# Patient Record
Sex: FEMALE | Race: WHITE
[De-identification: names, ages, dates, MRNs, and addresses within clinical notes are randomized per-mention and may not be internally consistent; named-entity substitution may affect disease eponyms.]

---

## 2017-02-01 ENCOUNTER — IMPORTED ENCOUNTER (OUTPATIENT)
Age: 57
End: 2017-02-01

## 2017-02-01 PROCEDURE — 92014 COMPRE OPH EXAM EST PT 1/>: CPT

## 2017-10-09 ASSESSMENT — TONOMETRY
OD_IOP_MMHG: 13
OS_IOP_MMHG: 15

## 2017-10-09 ASSESSMENT — VISUAL ACUITY
OD_SC: 20/20-2
OS_SC: 20/20

## 2022-10-22 ENCOUNTER — HOSPITAL ENCOUNTER (EMERGENCY)
Age: 62
Discharge: STILL ADMITTED OR ADMINISTRATIVE | End: 2022-10-22

## 2022-10-22 ENCOUNTER — APPOINTMENT (OUTPATIENT)
Dept: GENERAL RADIOLOGY | Age: 62
DRG: 308 | End: 2022-10-22
Attending: PHYSICIAN ASSISTANT
Payer: MEDICAID

## 2022-10-22 ENCOUNTER — HOSPITAL ENCOUNTER (INPATIENT)
Age: 62
LOS: 7 days | Discharge: NURSING, REHAB OR LONG-TERM CARE | DRG: 308 | End: 2022-10-29
Attending: INTERNAL MEDICINE | Admitting: INTERNAL MEDICINE
Payer: MEDICAID

## 2022-10-22 PROBLEM — S72.009A HIP FRACTURE (HCC): Status: ACTIVE | Noted: 2022-10-22

## 2022-10-22 LAB
ERYTHROCYTE [DISTWIDTH] IN BLOOD BY AUTOMATED COUNT: 14.1 % (ref 11.5–14.5)
HCT VFR BLD AUTO: 33.1 % (ref 35–47)
HGB BLD-MCNC: 10.9 G/DL (ref 11.5–16)
MAGNESIUM SERPL-MCNC: 1.7 MG/DL (ref 1.6–2.4)
MCH RBC QN AUTO: 32.9 PG (ref 26–34)
MCHC RBC AUTO-ENTMCNC: 32.9 G/DL (ref 30–36.5)
MCV RBC AUTO: 100 FL (ref 80–99)
NRBC # BLD: 0 K/UL (ref 0–0.01)
NRBC BLD-RTO: 0 PER 100 WBC
PLATELET # BLD AUTO: 147 K/UL (ref 150–400)
PMV BLD AUTO: 10.6 FL (ref 8.9–12.9)
RBC # BLD AUTO: 3.31 M/UL (ref 3.8–5.2)
TSH SERPL DL<=0.05 MIU/L-ACNC: 2.7 UIU/ML (ref 0.36–3.74)
WBC # BLD AUTO: 7.7 K/UL (ref 3.6–11)

## 2022-10-22 PROCEDURE — 74011250636 HC RX REV CODE- 250/636: Performed by: INTERNAL MEDICINE

## 2022-10-22 PROCEDURE — 36415 COLL VENOUS BLD VENIPUNCTURE: CPT

## 2022-10-22 PROCEDURE — 74011250637 HC RX REV CODE- 250/637: Performed by: INTERNAL MEDICINE

## 2022-10-22 PROCEDURE — 74011000250 HC RX REV CODE- 250: Performed by: INTERNAL MEDICINE

## 2022-10-22 PROCEDURE — 83735 ASSAY OF MAGNESIUM: CPT

## 2022-10-22 PROCEDURE — 74011250637 HC RX REV CODE- 250/637: Performed by: ORTHOPAEDIC SURGERY

## 2022-10-22 PROCEDURE — 73552 X-RAY EXAM OF FEMUR 2/>: CPT

## 2022-10-22 PROCEDURE — 84443 ASSAY THYROID STIM HORMONE: CPT

## 2022-10-22 PROCEDURE — 65270000046 HC RM TELEMETRY

## 2022-10-22 PROCEDURE — 85027 COMPLETE CBC AUTOMATED: CPT

## 2022-10-22 RX ORDER — HYDROMORPHONE HYDROCHLORIDE 2 MG/1
4 TABLET ORAL
Status: DISCONTINUED | OUTPATIENT
Start: 2022-10-22 | End: 2022-10-29 | Stop reason: HOSPADM

## 2022-10-22 RX ORDER — ONDANSETRON 2 MG/ML
4 INJECTION INTRAMUSCULAR; INTRAVENOUS
Status: DISCONTINUED | OUTPATIENT
Start: 2022-10-22 | End: 2022-10-29 | Stop reason: HOSPADM

## 2022-10-22 RX ORDER — SODIUM CHLORIDE 0.9 % (FLUSH) 0.9 %
5-40 SYRINGE (ML) INJECTION AS NEEDED
Status: DISCONTINUED | OUTPATIENT
Start: 2022-10-22 | End: 2022-10-29 | Stop reason: HOSPADM

## 2022-10-22 RX ORDER — POLYETHYLENE GLYCOL 3350 17 G/17G
17 POWDER, FOR SOLUTION ORAL DAILY PRN
Status: DISCONTINUED | OUTPATIENT
Start: 2022-10-22 | End: 2022-10-29 | Stop reason: HOSPADM

## 2022-10-22 RX ORDER — HYDROXYZINE HYDROCHLORIDE 10 MG/1
10 TABLET, FILM COATED ORAL
Status: DISCONTINUED | OUTPATIENT
Start: 2022-10-22 | End: 2022-10-29 | Stop reason: HOSPADM

## 2022-10-22 RX ORDER — HYDROMORPHONE HYDROCHLORIDE 2 MG/1
1 TABLET ORAL
Status: DISCONTINUED | OUTPATIENT
Start: 2022-10-22 | End: 2022-10-22

## 2022-10-22 RX ORDER — TRAMADOL HYDROCHLORIDE 50 MG/1
100 TABLET ORAL EVERY 6 HOURS
Status: DISCONTINUED | OUTPATIENT
Start: 2022-10-22 | End: 2022-10-22

## 2022-10-22 RX ORDER — ACETAMINOPHEN 325 MG/1
650 TABLET ORAL
Status: DISCONTINUED | OUTPATIENT
Start: 2022-10-22 | End: 2022-10-22

## 2022-10-22 RX ORDER — ACETAMINOPHEN 650 MG/1
650 SUPPOSITORY RECTAL
Status: DISCONTINUED | OUTPATIENT
Start: 2022-10-22 | End: 2022-10-22

## 2022-10-22 RX ORDER — SODIUM CHLORIDE, SODIUM LACTATE, POTASSIUM CHLORIDE, CALCIUM CHLORIDE 600; 310; 30; 20 MG/100ML; MG/100ML; MG/100ML; MG/100ML
75 INJECTION, SOLUTION INTRAVENOUS CONTINUOUS
Status: DISCONTINUED | OUTPATIENT
Start: 2022-10-22 | End: 2022-10-25

## 2022-10-22 RX ORDER — ONDANSETRON 4 MG/1
4 TABLET, ORALLY DISINTEGRATING ORAL
Status: DISCONTINUED | OUTPATIENT
Start: 2022-10-22 | End: 2022-10-29 | Stop reason: HOSPADM

## 2022-10-22 RX ORDER — HYDROMORPHONE HYDROCHLORIDE 2 MG/1
2 TABLET ORAL
Status: DISCONTINUED | OUTPATIENT
Start: 2022-10-22 | End: 2022-10-23

## 2022-10-22 RX ORDER — MORPHINE SULFATE 2 MG/ML
2 INJECTION, SOLUTION INTRAMUSCULAR; INTRAVENOUS
Status: DISCONTINUED | OUTPATIENT
Start: 2022-10-22 | End: 2022-10-22

## 2022-10-22 RX ORDER — TRAMADOL HYDROCHLORIDE 50 MG/1
100 TABLET ORAL EVERY 6 HOURS
Status: DISCONTINUED | OUTPATIENT
Start: 2022-10-23 | End: 2022-10-25

## 2022-10-22 RX ORDER — SODIUM CHLORIDE 0.9 % (FLUSH) 0.9 %
5-40 SYRINGE (ML) INJECTION EVERY 8 HOURS
Status: DISCONTINUED | OUTPATIENT
Start: 2022-10-22 | End: 2022-10-29 | Stop reason: HOSPADM

## 2022-10-22 RX ORDER — ACETAMINOPHEN 500 MG
1000 TABLET ORAL EVERY 6 HOURS
Status: DISCONTINUED | OUTPATIENT
Start: 2022-10-23 | End: 2022-10-29 | Stop reason: HOSPADM

## 2022-10-22 RX ADMIN — SODIUM CHLORIDE, POTASSIUM CHLORIDE, SODIUM LACTATE AND CALCIUM CHLORIDE 75 ML/HR: 600; 310; 30; 20 INJECTION, SOLUTION INTRAVENOUS at 15:35

## 2022-10-22 RX ADMIN — ACETAMINOPHEN 1000 MG: 500 TABLET ORAL at 23:09

## 2022-10-22 RX ADMIN — HYDROXYZINE HYDROCHLORIDE 10 MG: 10 TABLET ORAL at 17:53

## 2022-10-22 RX ADMIN — SODIUM CHLORIDE, PRESERVATIVE FREE 10 ML: 5 INJECTION INTRAVENOUS at 15:00

## 2022-10-22 RX ADMIN — SODIUM CHLORIDE, PRESERVATIVE FREE 10 ML: 5 INJECTION INTRAVENOUS at 21:37

## 2022-10-22 RX ADMIN — TRAMADOL HYDROCHLORIDE 100 MG: 50 TABLET, COATED ORAL at 23:09

## 2022-10-22 RX ADMIN — MORPHINE SULFATE 2 MG: 2 INJECTION, SOLUTION INTRAMUSCULAR; INTRAVENOUS at 15:32

## 2022-10-22 RX ADMIN — ACETAMINOPHEN 650 MG: 325 TABLET ORAL at 17:53

## 2022-10-22 RX ADMIN — HYDROMORPHONE HYDROCHLORIDE 1 MG: 2 TABLET ORAL at 16:39

## 2022-10-22 RX ADMIN — HYDROMORPHONE HYDROCHLORIDE 4 MG: 4 TABLET ORAL at 18:48

## 2022-10-22 NOTE — H&P
[facility] Adult  Hospitalist Group  History and Physical    Date of Service:  10/22/2022  Primary Care Provider: None  Source of information: The patient    Chief Complaint: No chief complaint on file. Right lower extremity pain, right femur fracture, periprosthetic    History of Presenting Illness:   Greg Ramirez is a 63 y.o. female with known past medical history of chronic atrial fibrillation flutter on anticoagulation with Coumadin, congestive heart failure, chronic kidney disease, obstructive sleep apnea on CPAP, osteoarthritis, right knee replacement 2 years ago who presents to ED with complaint of right knee pain after mechanical fall at home, patient tripped and fall in the kitchen on her right knee on Friday, yesterday. Initially she was evaluated in outside hospital, x-ray of the right knee was done and was significant for right femur fracture closed displaced comminuted fracture periprosthetic. So patient was transferred to [facility] for further evaluation and treatment and orthopedic consultation. The patient denied having any head trauma, loss of conscious, fever, chest pain, shortness of breath, productive cough, nausea, vomiting, diarrhea. She has a lot of pain in your right knee. Last time she took Eliquis was Friday morning, October 21, 2022. She usually able to ambulate with his cane. She uses CPAP at night, she does not require supplemental oxygen. The patient denies any headache, blurry vision, sore throat, trouble swallowing, trouble with speech, chest pain, SOB, cough, fever, chills, N/V/D, abd pain, urinary symptoms, constipation, recent travels, sick contacts, focal or generalized neurological symptoms, rashes, contact with COVID-19 diagnosed patients, hematemesis, melena, hemoptysis, hematuria, rashes, denies starting any new medications and denies any other concerns or problems besides as mentioned above.          REVIEW OF SYSTEMS:  A comprehensive review of systems was negative except for that written in the History of Present Illness. No past medical history on file. No past surgical history on file. Prior to Admission medications    Not on File   Atrial fibrillation, atrial flutter  Chronic kidney disease  Congestive heart failure, diastolic, LVH  And anxiety  Obstructive sleep apnea on CPAP  Osteoarthritis  Morbid obesity    Allergies   Allergen Reactions    Advil [Ibuprofen] Unknown (comments)    Aleve Pm [Naproxen-Diphenhydramine] Other (comments)    Aspirin Unknown (comments)    Bleach (Sodium Hypochlorite) Hives    Nsaids (Non-Steroidal Anti-Inflammatory Drug) Unknown (comments)      No family history on file. Social History:   Denied tobacco illicit drug use. Positive: Alcohol 3 drinks over the weekend. Family and social history were personally reviewed, all pertinent and relevant details are outlined as above. Family history significant for diabetes, stroke. Objective:   Visit Vitals  /70 (BP 1 Location: Left arm, BP Patient Position: At rest)   Temp 98.5 °F (36.9 °C)   Resp 18   SpO2 94%           PHYSICAL EXAM:   General: Alert x oriented x 3, awake, in acute distress due to pain right knee, in bed, pleasant female, appears to be stated age  HEENT: PEERL, EOMI, moist mucus membranes  Neck: Supple, no JVD, no meningeal signs  Chest: Clear to auscultation bilaterally   CVS: IRIR, S1 S2 heard, no rubs/gallops  Abd: Soft, non-tender, non-distended, +bowel sounds, obese  Ext: No clubbing, no cyanosis, no edema  Neuro/Psych: Pleasant mood and affect, CN 2-12 grossly intact, sensory grossly within normal limit, unable move RLE DTR 1+ x 4  Cap refill: Brisk, less than 3 seconds  Pulses: 2+, symmetric in all extremities  Skin: Warm, dry, without rashes or lesions    Data Review: All diagnostic labs and studies have been reviewed.     Abnormal Labs Reviewed - No data to display    All Micro Results       None IMAGING:   XR FEMUR RT 2 VS    (Results Pending)   Outside right knee XR:  Displaced femoral diaphysis right knee periprosthetic fracture. ECG/ECHO:  No results found for this or any previous visit. Assessment:   Given the patient's current clinical presentation, there is a high level of concern for decompensation if discharged from the emergency department. Complex decision making was performed, which includes reviewing the patient's available past medical records, laboratory results, and imaging studies. Right femoral fracture, diaphysis, periprosthetic    Status post mechanical fall at home,    Intractable pain right femur    Atrial fibrillation/atrial flutter on anticoagulation with Eliquis last dose October 21, 2020 2 in the morning    Congestive heart failure unknown systolic or diastolic    Obstructive sleep apnea on CPAP    Morbid obesity    Osteoarthritis  Plan:     The patient will be admitted to medicine, telemetry, is inpatient and will require at least 2 midnight for inpatient treatment. IV fluids for hydration will be provided. Pain will be controlled with Dilaudid IV. Patient will be kept n.p.o. for now  Ortho surgeon was consulted for further recommendation  Eliquis will be on hold  CPAP at nighttime will be provided  Home medication will be reconciled and reinstated except Eliquis will be on hold  ECG 12 leads and echocardiogram will be requested        DIET: ADULT DIET Regular   ISOLATION PRECAUTIONS: There are currently no Active Isolations  CODE STATUS: Full Code   DVT PROPHYLAXIS: SCDs  FUNCTIONAL STATUS PRIOR TO HOSPITALIZATION: Ambulatory and capable of self-care but relies on assistive devices (rolling walker/cane). Ambulatory status/function: By self   EARLY MOBILITY ASSESSMENT: Recommend an assessment from physical therapy and/or occupational therapy  ANTICIPATED DISCHARGE: 24-48 hours.   ANTICIPATED DISPOSITION: IPR  EMERGENCY CONTACT/SURROGATE DECISION MAKER: son    CRITICAL CARE WAS PERFORMED FOR THIS ENCOUNTER: NO.      Signed By: Kannan Wilson MD     October 22, 2022         Please note that this dictation may have been completed with Dragon, the computer voice recognition software. Quite often unanticipated grammatical, syntax, homophones, and other interpretive errors are inadvertently transcribed by the computer software. Please disregard these errors. Please excuse any errors that have escaped final proofreading.

## 2022-10-22 NOTE — PROGRESS NOTES
Problem: Falls - Risk of  Goal: *Absence of Falls  Description: Document Julian Lobato Fall Risk and appropriate interventions in the flowsheet.   Outcome: Progressing Towards Goal  Note: Fall Risk Interventions:                 Elimination Interventions: Call light in reach, Bed/chair exit alarm    History of Falls Interventions: Bed/chair exit alarm, Investigate reason for fall, Room close to nurse's station         Problem: Patient Education: Go to Patient Education Activity  Goal: Patient/Family Education  Outcome: Progressing Towards Goal

## 2022-10-22 NOTE — PROGRESS NOTES
Pt seen and examined. May eat. Bedrest, judicious pain management, mechanical dvt proph, plan for surgery Monday pm.  She had last vuong of eliquis noon Friday.  Sohan

## 2022-10-23 ENCOUNTER — APPOINTMENT (OUTPATIENT)
Dept: GENERAL RADIOLOGY | Age: 62
DRG: 308 | End: 2022-10-23
Attending: NURSE PRACTITIONER
Payer: MEDICAID

## 2022-10-23 LAB
ALBUMIN SERPL-MCNC: 3.4 G/DL (ref 3.5–5)
ALBUMIN/GLOB SERPL: 0.9 {RATIO} (ref 1.1–2.2)
ALP SERPL-CCNC: 80 U/L (ref 45–117)
ALT SERPL-CCNC: 27 U/L (ref 12–78)
ANION GAP SERPL CALC-SCNC: 8 MMOL/L (ref 5–15)
AST SERPL-CCNC: 23 U/L (ref 15–37)
ATRIAL RATE: 159 BPM
BASOPHILS # BLD: 0 K/UL (ref 0–0.1)
BASOPHILS NFR BLD: 1 % (ref 0–1)
BILIRUB SERPL-MCNC: 0.8 MG/DL (ref 0.2–1)
BUN SERPL-MCNC: 12 MG/DL (ref 6–20)
BUN/CREAT SERPL: 13 (ref 12–20)
CALCIUM SERPL-MCNC: 8.9 MG/DL (ref 8.5–10.1)
CALCULATED R AXIS, ECG10: 8 DEGREES
CALCULATED T AXIS, ECG11: 175 DEGREES
CHLORIDE SERPL-SCNC: 102 MMOL/L (ref 97–108)
CO2 SERPL-SCNC: 26 MMOL/L (ref 21–32)
CREAT SERPL-MCNC: 0.96 MG/DL (ref 0.55–1.02)
DIAGNOSIS, 93000: NORMAL
DIFFERENTIAL METHOD BLD: ABNORMAL
EOSINOPHIL # BLD: 0.1 K/UL (ref 0–0.4)
EOSINOPHIL NFR BLD: 2 % (ref 0–7)
ERYTHROCYTE [DISTWIDTH] IN BLOOD BY AUTOMATED COUNT: 14.1 % (ref 11.5–14.5)
GLOBULIN SER CALC-MCNC: 3.7 G/DL (ref 2–4)
GLUCOSE SERPL-MCNC: 156 MG/DL (ref 65–100)
HCT VFR BLD AUTO: 32.1 % (ref 35–47)
HGB BLD-MCNC: 10.6 G/DL (ref 11.5–16)
IMM GRANULOCYTES # BLD AUTO: 0 K/UL (ref 0–0.04)
IMM GRANULOCYTES NFR BLD AUTO: 0 % (ref 0–0.5)
INR PPP: 1 (ref 0.9–1.1)
LACTATE SERPL-SCNC: 2.4 MMOL/L (ref 0.4–2)
LACTATE SERPL-SCNC: 2.4 MMOL/L (ref 0.4–2)
LACTATE SERPL-SCNC: 2.6 MMOL/L (ref 0.4–2)
LYMPHOCYTES # BLD: 1.4 K/UL (ref 0.8–3.5)
LYMPHOCYTES NFR BLD: 20 % (ref 12–49)
MAGNESIUM SERPL-MCNC: 1.5 MG/DL (ref 1.6–2.4)
MCH RBC QN AUTO: 33.2 PG (ref 26–34)
MCHC RBC AUTO-ENTMCNC: 33 G/DL (ref 30–36.5)
MCV RBC AUTO: 100.6 FL (ref 80–99)
MONOCYTES # BLD: 0.6 K/UL (ref 0–1)
MONOCYTES NFR BLD: 9 % (ref 5–13)
NEUTS SEG # BLD: 4.8 K/UL (ref 1.8–8)
NEUTS SEG NFR BLD: 68 % (ref 32–75)
NRBC # BLD: 0 K/UL (ref 0–0.01)
NRBC BLD-RTO: 0 PER 100 WBC
PLATELET # BLD AUTO: 142 K/UL (ref 150–400)
PMV BLD AUTO: 10.2 FL (ref 8.9–12.9)
POTASSIUM SERPL-SCNC: 3.5 MMOL/L (ref 3.5–5.1)
PROT SERPL-MCNC: 7.1 G/DL (ref 6.4–8.2)
PROTHROMBIN TIME: 10.6 SEC (ref 9–11.1)
Q-T INTERVAL, ECG07: 214 MS
QRS DURATION, ECG06: 88 MS
QTC CALCULATION (BEZET), ECG08: 347 MS
RBC # BLD AUTO: 3.19 M/UL (ref 3.8–5.2)
SODIUM SERPL-SCNC: 136 MMOL/L (ref 136–145)
VENTRICULAR RATE, ECG03: 158 BPM
WBC # BLD AUTO: 7 K/UL (ref 3.6–11)

## 2022-10-23 PROCEDURE — 71045 X-RAY EXAM CHEST 1 VIEW: CPT

## 2022-10-23 PROCEDURE — 36415 COLL VENOUS BLD VENIPUNCTURE: CPT

## 2022-10-23 PROCEDURE — 74011250637 HC RX REV CODE- 250/637: Performed by: INTERNAL MEDICINE

## 2022-10-23 PROCEDURE — 74011250637 HC RX REV CODE- 250/637: Performed by: PHYSICIAN ASSISTANT

## 2022-10-23 PROCEDURE — 83605 ASSAY OF LACTIC ACID: CPT

## 2022-10-23 PROCEDURE — 74011250636 HC RX REV CODE- 250/636: Performed by: NURSE PRACTITIONER

## 2022-10-23 PROCEDURE — 83735 ASSAY OF MAGNESIUM: CPT

## 2022-10-23 PROCEDURE — 85025 COMPLETE CBC W/AUTO DIFF WBC: CPT

## 2022-10-23 PROCEDURE — 74011000250 HC RX REV CODE- 250: Performed by: INTERNAL MEDICINE

## 2022-10-23 PROCEDURE — 80053 COMPREHEN METABOLIC PANEL: CPT

## 2022-10-23 PROCEDURE — 65270000046 HC RM TELEMETRY

## 2022-10-23 PROCEDURE — 74011250637 HC RX REV CODE- 250/637: Performed by: HOSPITALIST

## 2022-10-23 PROCEDURE — 74011250637 HC RX REV CODE- 250/637: Performed by: NURSE PRACTITIONER

## 2022-10-23 PROCEDURE — 93005 ELECTROCARDIOGRAM TRACING: CPT

## 2022-10-23 PROCEDURE — 85610 PROTHROMBIN TIME: CPT

## 2022-10-23 PROCEDURE — 74011250637 HC RX REV CODE- 250/637: Performed by: ORTHOPAEDIC SURGERY

## 2022-10-23 PROCEDURE — 74011000250 HC RX REV CODE- 250: Performed by: NURSE PRACTITIONER

## 2022-10-23 RX ORDER — DILTIAZEM HYDROCHLORIDE 5 MG/ML
5 INJECTION INTRAVENOUS ONCE
Status: COMPLETED | OUTPATIENT
Start: 2022-10-23 | End: 2022-10-23

## 2022-10-23 RX ORDER — CYCLOBENZAPRINE HCL 10 MG
10 TABLET ORAL
Status: DISCONTINUED | OUTPATIENT
Start: 2022-10-23 | End: 2022-10-29 | Stop reason: HOSPADM

## 2022-10-23 RX ORDER — HYDROMORPHONE HYDROCHLORIDE 2 MG/1
2 TABLET ORAL
Status: DISCONTINUED | OUTPATIENT
Start: 2022-10-23 | End: 2022-10-29 | Stop reason: HOSPADM

## 2022-10-23 RX ORDER — METOPROLOL TARTRATE 50 MG/1
50 TABLET ORAL 2 TIMES DAILY
Status: DISCONTINUED | OUTPATIENT
Start: 2022-10-23 | End: 2022-10-29 | Stop reason: HOSPADM

## 2022-10-23 RX ORDER — ZOLPIDEM TARTRATE 5 MG/1
5 TABLET ORAL
Status: DISCONTINUED | OUTPATIENT
Start: 2022-10-23 | End: 2022-10-29 | Stop reason: HOSPADM

## 2022-10-23 RX ADMIN — SODIUM CHLORIDE, PRESERVATIVE FREE 10 ML: 5 INJECTION INTRAVENOUS at 21:35

## 2022-10-23 RX ADMIN — TRAMADOL HYDROCHLORIDE 100 MG: 50 TABLET, COATED ORAL at 18:01

## 2022-10-23 RX ADMIN — HYDROMORPHONE HYDROCHLORIDE 4 MG: 4 TABLET ORAL at 23:04

## 2022-10-23 RX ADMIN — ACETAMINOPHEN 1000 MG: 500 TABLET ORAL at 18:01

## 2022-10-23 RX ADMIN — TRAMADOL HYDROCHLORIDE 100 MG: 50 TABLET, COATED ORAL at 11:35

## 2022-10-23 RX ADMIN — ACETAMINOPHEN 1000 MG: 500 TABLET ORAL at 23:03

## 2022-10-23 RX ADMIN — DILTIAZEM HYDROCHLORIDE 5 MG: 5 INJECTION, SOLUTION INTRAVENOUS at 07:28

## 2022-10-23 RX ADMIN — ACETAMINOPHEN 1000 MG: 500 TABLET ORAL at 11:35

## 2022-10-23 RX ADMIN — ACETAMINOPHEN 1000 MG: 500 TABLET ORAL at 05:08

## 2022-10-23 RX ADMIN — METOPROLOL TARTRATE 50 MG: 50 TABLET ORAL at 18:01

## 2022-10-23 RX ADMIN — CYCLOBENZAPRINE 10 MG: 10 TABLET, FILM COATED ORAL at 11:36

## 2022-10-23 RX ADMIN — ZOLPIDEM TARTRATE 5 MG: 5 TABLET ORAL at 23:04

## 2022-10-23 RX ADMIN — HYDROMORPHONE HYDROCHLORIDE 4 MG: 4 TABLET ORAL at 18:00

## 2022-10-23 RX ADMIN — TRAMADOL HYDROCHLORIDE 100 MG: 50 TABLET, COATED ORAL at 05:08

## 2022-10-23 RX ADMIN — HYDROXYZINE HYDROCHLORIDE 10 MG: 10 TABLET ORAL at 06:04

## 2022-10-23 RX ADMIN — METOPROLOL TARTRATE 50 MG: 50 TABLET ORAL at 05:55

## 2022-10-23 RX ADMIN — HYDROMORPHONE HYDROCHLORIDE 2 MG: 2 TABLET ORAL at 02:43

## 2022-10-23 RX ADMIN — SODIUM CHLORIDE, PRESERVATIVE FREE 10 ML: 5 INJECTION INTRAVENOUS at 05:08

## 2022-10-23 NOTE — PROGRESS NOTES
[facility]ist [facility]                                                                                          Hospitalist Progress Note  [place], [place]  Answering service: [phone] OR [phone] from in house phone        Date of Service:  10/23/2022  NAME:  Dylan Fair  :  1960  MRN:  [id number]      Admission Summary:   Dylan Fair is a 63 y.o. female with known past medical history of chronic atrial fibrillation flutter on anticoagulation with Coumadin, congestive heart failure, chronic kidney disease, obstructive sleep apnea on CPAP, osteoarthritis, right knee replacement 2 years ago who presents to ED with complaint of right knee pain after mechanical fall at home, patient tripped and fall in the kitchen on her right knee on Friday, yesterday. Initially she was evaluated in outside hospital, x-ray of the right knee was done and was significant for right femur fracture closed displaced comminuted fracture periprosthetic. So patient was [facility] to [facility] for further evaluation and treatment and orthopedic consultation. She uses CPAP at night, she does not require supplemental oxygen. Interval history / Subjective:   Pt had episode of A-fib with RVR, pt reports HR was in 150s. Recived 5mg diltiazem, CXR was without acute cardiopulmonary process. Pt denies any chest pain, palpitations, shortness of breath or lightheadedness. She endorses some mild back pain from her fall Friday,      Assessment & Plan:     Chronic Atrial Flutter  Atrial Fibrillation with RVR  - Pt went into A-fib with RVR overnight 10/22-. Received diltiazem 5mg  - CXR (10/23):  No acute cardiopulmonary process  - Resume home metoprolol  - Currently rate controlled in the 80s, will consider further doses of diltiazem if needed  - Holding home eliquis for anticipated orthopedic surgery intervention    Comminuted fracture of Right femur  - Xray: demonstrates femoral shaft fracture  - Ortho following: Plan for procedure tomorrow (10/24)  - Hold home eliquis  - Scheduled tramadol  - PRN dilaudid, flexeril  - Lactate 2.6, down-trended to 2.4, will recheck  in AM unless clinically indicated    Obstructive sleep apnea  - bedtime CPAP ordered    Code status: Full  Prophylaxis: SCDs, holding eliquis for upcoming procedure  Care Plan discussed with: Patient, RN student, consulting MD  Anticipated Disposition: TBD     Hospital Problems  Never Reviewed            Codes Class Noted POA    Hip fracture [facility]) ICD-10-CM: S72.009A  ICD-9-CM: 820.8  10/22/2022 Unknown             Review of Systems:   Pertinent items are noted in HPI. Vital Signs:    Last 24hrs VS reviewed since prior progress note. Most recent are:  Visit Vitals  /78 (BP 1 Location: Left upper arm, BP Patient Position: At rest)   Pulse (!) 105   Temp 99.1 °F (37.3 °C)   Resp 18   SpO2 93%         Intake/Output Summary (Last 24 hours) at 10/23/2022 0933  Last data filed at 10/23/2022 3387  Gross per 24 hour   Intake --   Output 900 ml   Net -900 ml        Physical Examination:     I had a face to face encounter with this patient and independently examined them on 10/23/2022 as outlined below:          Constitutional:  No acute distress, cooperative, pleasant    ENT:  Oral mucosa moist, oropharynx benign. Resp:  CTA bilaterally. No wheezing/rhonchi/rales. No accessory muscle use. CV:  Tachycardic, irregularly irregular rhythm, no murmurs, gallops, rubs    GI:  Soft, non distended, non tender. normoactive bowel sounds, no hepatosplenomegaly    Musculoskeletal:  RLE with brace and ACE wrap in place dressing clean, dry intact, trace edema in BLE, 2+ pulses throughout, neurovascularly grossly intact in BLE    Neurologic:  Moves all extremities.   AAOx3, CN II-XII reviewed            Data Review:    Review and/or order of clinical lab test  Review and/or order of tests in the radiology section of CPT  Review and/or order of tests in the medicine section of [facility]      Labs:     Recent Labs     10/23/22  0327 10/22/22  1720   WBC 7.0 7.7   HGB 10.6* 10.9*   HCT 32.1* 33.1*   * 147*     Recent Labs     10/23/22  0327 10/22/22  1720     --    K 3.5  --      --    CO2 26  --    BUN 12  --    CREA 0.96  --    *  --    CA 8.9  --    MG 1.5* 1.7     Recent Labs     10/23/22  0327   ALT 27   AP 80   TBILI 0.8   TP 7.1   ALB 3.4*   GLOB 3.7     Recent Labs     10/23/22  0327   INR 1.0   PTP 10.6      No results for input(s): FE, TIBC, PSAT, FERR in the last 72 hours. No results found for: FOL, RBCF   No results for input(s): PH, PCO2, PO2 in the last 72 hours. No results for input(s): CPK, CKNDX, TROIQ in the last 72 hours.     No lab exists for component: CPKMB  No results found for: CHOL, CHOLX, CHLST, CHOLV, HDL, HDLP, LDL, LDLC, DLDLP, TGLX, TRIGL, TRIGP, CHHD, CHHDX  No results found for: GLUCPOC  No results found for: COLOR, APPRN, SPGRU, REFSG, SHANELLE, PROTU, GLUCU, KETU, BILU, UROU, WOLF, LEUKU, GLUKE, EPSU, BACTU, WBCU, RBCU, CASTS, UCRY      Medications Reviewed:     Current Facility-Administered Medications   Medication Dose Route Frequency    metoprolol tartrate (LOPRESSOR) tablet 50 mg  50 mg Oral BID    sodium chloride 0.9 % bolus infusion 250 mL  250 mL IntraVENous ONCE    sodium chloride (NS) flush 5-40 mL  5-40 mL IntraVENous Q8H    sodium chloride (NS) flush 5-40 mL  5-40 mL IntraVENous PRN    polyethylene glycol (MIRALAX) packet 17 g  17 g Oral DAILY PRN    ondansetron (ZOFRAN ODT) tablet 4 mg  4 mg Oral Q8H PRN    Or    ondansetron (ZOFRAN) injection 4 mg  4 mg IntraVENous Q6H PRN    lactated Ringers infusion  75 mL/hr IntraVENous CONTINUOUS    hydrOXYzine HCL (ATARAX) tablet 10 mg  10 mg Oral TID PRN    HYDROmorphone (DILAUDID) tablet 2 mg  2 mg Oral Q4H PRN    acetaminophen (TYLENOL) tablet 1,000 mg  1,000 mg Oral Q6H    HYDROmorphone (DILAUDID) tablet 4 mg  4 mg Oral Q4H PRN    traMADoL (ULTRAM) tablet 100 mg  100 mg Oral Q6H     ______________________________________________________________________  EXPECTED LENGTH OF STAY: - - -  ACTUAL LENGTH OF STAY:          1                 Arch Holler Milligram, PA-C

## 2022-10-23 NOTE — PROGRESS NOTES
Physical Therapy  Patient admitted following GLF with fracture of femur. Is now on bed rest awaiting surgery. Will follow up post op.   Aki Naqvi PT

## 2022-10-23 NOTE — PROGRESS NOTES
Occupational Therapy Note:     Pt with GLF prior to admission and found to have hip fracture. Pt to be on bedrest pending surgery currently planned for Monday pm.  Will hold until after surgery.   Thanks,     Adrianna Morales, OT

## 2022-10-23 NOTE — PROGRESS NOTES
Patient is a pleasant 72-year-old woman who was in her kitchen last night when she sustained a ground level fall. Unable to walk. She lives in [place], [place]. She went to a local facility where no one was able to treat her so she was transferred to . She is on Eliquis and took her last dose at noon on Friday. Had a total knee done many years ago on the right and did well with that. On exam in pain laying in the hospital bed with a knee immobilizer in place. Grossly neurovascularly intact and no calf tenderness. X-rays including AP and lateral show a comminuted fracture of the midshaft of the femur above a total knee with a butterfly fragment. For now, judicious pain management, DVT prophylaxis, mechanical, bed rest and ice and elevation watch for neurovascular compromise. Knee immobilizer. Medical clearance. Plan to proceed with surgery next week. Dr. Anaid Jim will take over. Encounter was over 30 minutes in face-to-face contact time and coordination of care over half of which was spent in counseling.

## 2022-10-23 NOTE — PROGRESS NOTES
ORTHO FRACTURE PROGRESS NOTE    2022  Admit Date:   10/22/2022    Post Op day: * No surgery found *    Subjective:    Bernardo People states that her pain is relatively well controlled but she notices when pain meds wear off. States she has some muscle spasms as well. Right TKA done about 12yrs ago per patient in [address] [place] [address] by a Dr Jeremy Alejo. Has been following with [facility]. States she was doing well with knee prior to injury when she slipped and fell on a puddle in her kitchen. Denies tingling or numbness.   Tolerating diet  Denies N/V/SOB or CP     PT/OT:   Gait:                    Vital Signs:    Patient Vitals for the past 8 hrs:   BP Temp Pulse Resp SpO2 Weight   10/23/22 1015 -- -- -- -- -- 150 kg (330 lb 11 oz)   10/23/22 1000 -- -- (!) 118 -- -- --   10/23/22 0755 -- -- (!) 105 -- -- --   10/23/22 0723 120/78 99.1 °F (37.3 °C) (!) 117 18 93 % --   10/23/22 0555 129/84 -- (!) 154 -- -- --   10/23/22 0355 120/76 98.2 °F (36.8 °C) 88 18 -- --     Temp (24hrs), Av.6 °F (37 °C), Min:98.2 °F (36.8 °C), Max:99.1 °F (37.3 °C)      Pain Control:   Pain Assessment  Pain Scale 1: Numeric (0 - 10)  Pain Intensity 1: 0  Pain Location 1: Hip  Pain Description 1: Constant  Pain Intervention(s) 1: Medication (see MAR)    Meds:    Current Facility-Administered Medications   Medication Dose Route Frequency    metoprolol tartrate (LOPRESSOR) tablet 50 mg  50 mg Oral BID    sodium chloride 0.9 % bolus infusion 250 mL  250 mL IntraVENous ONCE    cyclobenzaprine (FLEXERIL) tablet 10 mg  10 mg Oral TID PRN    sodium chloride (NS) flush 5-40 mL  5-40 mL IntraVENous Q8H    sodium chloride (NS) flush 5-40 mL  5-40 mL IntraVENous PRN    polyethylene glycol (MIRALAX) packet 17 g  17 g Oral DAILY PRN    ondansetron (ZOFRAN ODT) tablet 4 mg  4 mg Oral Q8H PRN    Or    ondansetron (ZOFRAN) injection 4 mg  4 mg IntraVENous Q6H PRN    lactated Ringers infusion  75 mL/hr IntraVENous CONTINUOUS    hydrOXYzine HCL (ATARAX) tablet 10 mg  10 mg Oral TID PRN    HYDROmorphone (DILAUDID) tablet 2 mg  2 mg Oral Q4H PRN    acetaminophen (TYLENOL) tablet 1,000 mg  1,000 mg Oral Q6H    HYDROmorphone (DILAUDID) tablet 4 mg  4 mg Oral Q4H PRN    traMADoL (ULTRAM) tablet 100 mg  100 mg Oral Q6H       LAB:    Recent Labs     10/23/22  0327   HCT 32.1*   HGB 10.6*   INR 1.0       Transfuse PRBC's:      Assessment & Physician's Comment:  Neurovascular checks within normal limits  Orientation:  Oriented  RLE in knee immobilizer and ACE wrap; distal sensory function grossly intact; moves through ankles and toes to command  Distal pulses dopplerable    Active Problems:    Hip fracture ([address] (10/22/2022)        Plan:    Acute comminuted fracture of Right distal femur above level of total knee    Patient transferred here from [address] [place] [address] for further management; can see office notes from recent visits out there prior to injury but cannot find Op Note. Index surgeon does not appear part of [address] [place] [address] where she is currently treated.  Most recently seen by Noemi GARSIA - [phone](H)  Patient will require surgical fixation   NPO after midnight except meds  Bed rest  Pain control ongoing but will add Flexeril for muscle spasms  Note A fib concerns but have spoken with Hospitalist PA who does not anticipate any surgical delays  Holding chemical antioagulation  Consent for ORIF of Right femur with retrograde nail and poly swap with Dr Rankin Record afternoon  Medical management per primary team    Carolyn Suh and Rica Mcburney aware of patient and in agreement with current plan    Tobin MOOREC  Orthopedic Trauma Service  [facility]

## 2022-10-23 NOTE — PROGRESS NOTES
Patient resting in bed, leg brace on, weathers catheter, NPO at midnight. Problem: Falls - Risk of  Goal: *Absence of Falls  Description: Document [address] Fall Risk and appropriate interventions in the flowsheet. Outcome: Progressing Towards Goal  Note: Fall Risk Interventions:                 Elimination Interventions: Toileting schedule/hourly rounds, Patient to call for help with toileting needs    History of Falls Interventions: Door open when patient unattended, Room close to nurse's station         Problem: Patient Education: Go to Patient Education Activity  Goal: Patient/Family Education  Outcome: Progressing Towards Goal     Problem: Pressure Injury - Risk of  Goal: *Prevention of pressure injury  Description: Document Wilber Scale and appropriate interventions in the flowsheet.   Outcome: Progressing Towards Goal  Note: Pressure Injury Interventions:  Sensory Interventions: Float heels, Keep linens dry and wrinkle-free, Maintain/enhance activity level    Moisture Interventions: Apply protective barrier, creams and emollients, Absorbent underpads, Limit adult briefs, Internal/External urinary devices    Activity Interventions: Increase time out of bed    Mobility Interventions: Float heels, HOB 30 degrees or less, Pressure redistribution bed/mattress (bed type)    Nutrition Interventions: Offer support with meals,snacks and hydration    Friction and Shear Interventions: HOB 30 degrees or less, Lift sheet, Lift team/patient mobility team                Problem: Patient Education: Go to Patient Education Activity  Goal: Patient/Family Education  Outcome: Progressing Towards Goal

## 2022-10-23 NOTE — PROGRESS NOTES
Bedside and Verbal shift change report given to [address] (oncoming nurse) by [facility] (offgoing nurse). Report included the following information Kardex.

## 2022-10-23 NOTE — PROGRESS NOTES
Bedside shift change report given to [address]  (oncoming nurse) by Arturo Serrano  (offgoing nurse). Report included the following information SBAR, Kardex, MAR, and Recent Results.

## 2022-10-24 ENCOUNTER — ANESTHESIA EVENT (OUTPATIENT)
Dept: SURGERY | Age: 62
DRG: 308 | End: 2022-10-24
Payer: MEDICAID

## 2022-10-24 ENCOUNTER — APPOINTMENT (OUTPATIENT)
Dept: GENERAL RADIOLOGY | Age: 62
DRG: 308 | End: 2022-10-24
Attending: ORTHOPAEDIC SURGERY
Payer: MEDICAID

## 2022-10-24 ENCOUNTER — APPOINTMENT (OUTPATIENT)
Dept: NON INVASIVE DIAGNOSTICS | Age: 62
DRG: 308 | End: 2022-10-24
Attending: INTERNAL MEDICINE
Payer: MEDICAID

## 2022-10-24 ENCOUNTER — ANESTHESIA (OUTPATIENT)
Dept: SURGERY | Age: 62
DRG: 308 | End: 2022-10-24
Payer: MEDICAID

## 2022-10-24 LAB
ANION GAP SERPL CALC-SCNC: 6 MMOL/L (ref 5–15)
BASOPHILS # BLD: 0 K/UL (ref 0–0.1)
BASOPHILS NFR BLD: 0 % (ref 0–1)
BUN SERPL-MCNC: 11 MG/DL (ref 6–20)
BUN/CREAT SERPL: 11 (ref 12–20)
CALCIUM SERPL-MCNC: 8.7 MG/DL (ref 8.5–10.1)
CHLORIDE SERPL-SCNC: 101 MMOL/L (ref 97–108)
CO2 SERPL-SCNC: 28 MMOL/L (ref 21–32)
CREAT SERPL-MCNC: 0.96 MG/DL (ref 0.55–1.02)
DIFFERENTIAL METHOD BLD: ABNORMAL
ECHO AO ROOT DIAM: 3.2 CM
ECHO AO ROOT INDEX: 1.32 CM/M2
ECHO AV AREA PEAK VELOCITY: 2.7 CM2
ECHO AV AREA/BSA PEAK VELOCITY: 1.1 CM2/M2
ECHO AV PEAK GRADIENT: 10 MMHG
ECHO AV PEAK VELOCITY: 1.6 M/S
ECHO AV VELOCITY RATIO: 0.81
ECHO EST RA PRESSURE: 10 MMHG
ECHO LA DIAMETER INDEX: 2.15 CM/M2
ECHO LA DIAMETER: 5.2 CM
ECHO LA TO AORTIC ROOT RATIO: 1.63
ECHO LV E' LATERAL VELOCITY: 13 CM/S
ECHO LV E' SEPTAL VELOCITY: 6 CM/S
ECHO LV FRACTIONAL SHORTENING: 39 % (ref 28–44)
ECHO LV INTERNAL DIMENSION DIASTOLE INDEX: 2.02 CM/M2
ECHO LV INTERNAL DIMENSION DIASTOLIC: 4.9 CM (ref 3.9–5.3)
ECHO LV INTERNAL DIMENSION SYSTOLIC INDEX: 1.24 CM/M2
ECHO LV INTERNAL DIMENSION SYSTOLIC: 3 CM
ECHO LV IVSD: 1 CM (ref 0.6–0.9)
ECHO LV MASS 2D: 164.3 G (ref 67–162)
ECHO LV MASS INDEX 2D: 67.9 G/M2 (ref 43–95)
ECHO LV POSTERIOR WALL DIASTOLIC: 0.9 CM (ref 0.6–0.9)
ECHO LV RELATIVE WALL THICKNESS RATIO: 0.37
ECHO LVOT AREA: 3.1 CM2
ECHO LVOT DIAM: 2 CM
ECHO LVOT PEAK GRADIENT: 7 MMHG
ECHO LVOT PEAK VELOCITY: 1.3 M/S
ECHO MV A VELOCITY: 1.04 M/S
ECHO MV AREA PHT: 2.9 CM2
ECHO MV E DECELERATION TIME (DT): 259.8 MS
ECHO MV E VELOCITY: 1.01 M/S
ECHO MV E/A RATIO: 0.97
ECHO MV E/E' LATERAL: 7.77
ECHO MV E/E' RATIO (AVERAGED): 12.3
ECHO MV E/E' SEPTAL: 16.83
ECHO MV PRESSURE HALF TIME (PHT): 75.3 MS
ECHO PV MAX VELOCITY: 1.2 M/S
ECHO PV PEAK GRADIENT: 5 MMHG
ECHO RIGHT VENTRICULAR SYSTOLIC PRESSURE (RVSP): 32 MMHG
ECHO RV FREE WALL PEAK S': 17 CM/S
ECHO RV INTERNAL DIMENSION: 3.9 CM
ECHO RV TAPSE: 2.5 CM (ref 1.7–?)
ECHO TV REGURGITANT MAX VELOCITY: 2.32 M/S
ECHO TV REGURGITANT PEAK GRADIENT: 22 MMHG
EOSINOPHIL # BLD: 0.2 K/UL (ref 0–0.4)
EOSINOPHIL NFR BLD: 2 % (ref 0–7)
ERYTHROCYTE [DISTWIDTH] IN BLOOD BY AUTOMATED COUNT: 13.7 % (ref 11.5–14.5)
GLUCOSE BLD STRIP.AUTO-MCNC: 142 MG/DL (ref 65–117)
GLUCOSE SERPL-MCNC: 172 MG/DL (ref 65–100)
HCT VFR BLD AUTO: 29.9 % (ref 35–47)
HGB BLD-MCNC: 9.7 G/DL (ref 11.5–16)
IMM GRANULOCYTES # BLD AUTO: 0.1 K/UL (ref 0–0.04)
IMM GRANULOCYTES NFR BLD AUTO: 1 % (ref 0–0.5)
LACTATE SERPL-SCNC: 2.2 MMOL/L (ref 0.4–2)
LYMPHOCYTES # BLD: 1.2 K/UL (ref 0.8–3.5)
LYMPHOCYTES NFR BLD: 14 % (ref 12–49)
MAGNESIUM SERPL-MCNC: 1.7 MG/DL (ref 1.6–2.4)
MCH RBC QN AUTO: 33.2 PG (ref 26–34)
MCHC RBC AUTO-ENTMCNC: 32.4 G/DL (ref 30–36.5)
MCV RBC AUTO: 102.4 FL (ref 80–99)
MONOCYTES # BLD: 0.7 K/UL (ref 0–1)
MONOCYTES NFR BLD: 9 % (ref 5–13)
NEUTS SEG # BLD: 6 K/UL (ref 1.8–8)
NEUTS SEG NFR BLD: 74 % (ref 32–75)
NRBC # BLD: 0 K/UL (ref 0–0.01)
NRBC BLD-RTO: 0 PER 100 WBC
PHOSPHATE SERPL-MCNC: 3.2 MG/DL (ref 2.6–4.7)
PLATELET # BLD AUTO: 143 K/UL (ref 150–400)
PMV BLD AUTO: 10.5 FL (ref 8.9–12.9)
POTASSIUM SERPL-SCNC: 3.8 MMOL/L (ref 3.5–5.1)
RBC # BLD AUTO: 2.92 M/UL (ref 3.8–5.2)
SERVICE CMNT-IMP: ABNORMAL
SODIUM SERPL-SCNC: 135 MMOL/L (ref 136–145)
WBC # BLD AUTO: 8.2 K/UL (ref 3.6–11)

## 2022-10-24 PROCEDURE — P9047 ALBUMIN (HUMAN), 25%, 50ML: HCPCS | Performed by: NURSE ANESTHETIST, CERTIFIED REGISTERED

## 2022-10-24 PROCEDURE — 83735 ASSAY OF MAGNESIUM: CPT

## 2022-10-24 PROCEDURE — 76210000006 HC OR PH I REC 0.5 TO 1 HR: Performed by: ORTHOPAEDIC SURGERY

## 2022-10-24 PROCEDURE — 74011250637 HC RX REV CODE- 250/637: Performed by: HOSPITALIST

## 2022-10-24 PROCEDURE — 74011000250 HC RX REV CODE- 250: Performed by: NURSE ANESTHETIST, CERTIFIED REGISTERED

## 2022-10-24 PROCEDURE — 85025 COMPLETE CBC W/AUTO DIFF WBC: CPT

## 2022-10-24 PROCEDURE — 74011250636 HC RX REV CODE- 250/636: Performed by: ORTHOPAEDIC SURGERY

## 2022-10-24 PROCEDURE — 74011250636 HC RX REV CODE- 250/636: Performed by: NURSE ANESTHETIST, CERTIFIED REGISTERED

## 2022-10-24 PROCEDURE — 77030041279 HC DRSG PRMSL AG MDII -B: Performed by: ORTHOPAEDIC SURGERY

## 2022-10-24 PROCEDURE — 74011000258 HC RX REV CODE- 258: Performed by: ORTHOPAEDIC SURGERY

## 2022-10-24 PROCEDURE — 83605 ASSAY OF LACTIC ACID: CPT

## 2022-10-24 PROCEDURE — 77030010507 HC ADH SKN DERMBND J&J -B: Performed by: ORTHOPAEDIC SURGERY

## 2022-10-24 PROCEDURE — 74011250637 HC RX REV CODE- 250/637: Performed by: ORTHOPAEDIC SURGERY

## 2022-10-24 PROCEDURE — 74011250636 HC RX REV CODE- 250/636: Performed by: FAMILY MEDICINE

## 2022-10-24 PROCEDURE — 73552 X-RAY EXAM OF FEMUR 2/>: CPT

## 2022-10-24 PROCEDURE — 77030031139 HC SUT VCRL2 J&J -A: Performed by: ORTHOPAEDIC SURGERY

## 2022-10-24 PROCEDURE — 0QS806Z REPOSITION RIGHT FEMORAL SHAFT WITH INTRAMEDULLARY INTERNAL FIXATION DEVICE, OPEN APPROACH: ICD-10-PCS | Performed by: ORTHOPAEDIC SURGERY

## 2022-10-24 PROCEDURE — 74011250637 HC RX REV CODE- 250/637: Performed by: NURSE PRACTITIONER

## 2022-10-24 PROCEDURE — 77030008462 HC STPLR SKN PROX J&J -A: Performed by: ORTHOPAEDIC SURGERY

## 2022-10-24 PROCEDURE — 80048 BASIC METABOLIC PNL TOTAL CA: CPT

## 2022-10-24 PROCEDURE — C1769 GUIDE WIRE: HCPCS | Performed by: ORTHOPAEDIC SURGERY

## 2022-10-24 PROCEDURE — 74011250637 HC RX REV CODE- 250/637: Performed by: PHYSICIAN ASSISTANT

## 2022-10-24 PROCEDURE — C1713 ANCHOR/SCREW BN/BN,TIS/BN: HCPCS | Performed by: ORTHOPAEDIC SURGERY

## 2022-10-24 PROCEDURE — 77030013079 HC BLNKT BAIR HGGR 3M -A: Performed by: ANESTHESIOLOGY

## 2022-10-24 PROCEDURE — 77030008684 HC TU ET CUF COVD -B: Performed by: ANESTHESIOLOGY

## 2022-10-24 PROCEDURE — C8929 TTE W OR WO FOL WCON,DOPPLER: HCPCS

## 2022-10-24 PROCEDURE — 82962 GLUCOSE BLOOD TEST: CPT

## 2022-10-24 PROCEDURE — 74011000250 HC RX REV CODE- 250: Performed by: INTERNAL MEDICINE

## 2022-10-24 PROCEDURE — 74011000250 HC RX REV CODE- 250: Performed by: FAMILY MEDICINE

## 2022-10-24 PROCEDURE — 93306 TTE W/DOPPLER COMPLETE: CPT | Performed by: SPECIALIST

## 2022-10-24 PROCEDURE — 77030020274 HC MISC IMPL ORTHOPEDIC: Performed by: ORTHOPAEDIC SURGERY

## 2022-10-24 PROCEDURE — 65270000046 HC RM TELEMETRY

## 2022-10-24 PROCEDURE — 74011000250 HC RX REV CODE- 250: Performed by: ORTHOPAEDIC SURGERY

## 2022-10-24 PROCEDURE — 36415 COLL VENOUS BLD VENIPUNCTURE: CPT

## 2022-10-24 PROCEDURE — 84100 ASSAY OF PHOSPHORUS: CPT

## 2022-10-24 PROCEDURE — 77030026438 HC STYL ET INTUB CARD -A: Performed by: ANESTHESIOLOGY

## 2022-10-24 PROCEDURE — 77030035236 HC SUT PDS STRATFX BARB J&J -B: Performed by: ORTHOPAEDIC SURGERY

## 2022-10-24 PROCEDURE — 2709999900 HC NON-CHARGEABLE SUPPLY: Performed by: ORTHOPAEDIC SURGERY

## 2022-10-24 PROCEDURE — 76010000133 HC OR TIME 3 TO 3.5 HR: Performed by: ORTHOPAEDIC SURGERY

## 2022-10-24 PROCEDURE — 76060000037 HC ANESTHESIA 3 TO 3.5 HR: Performed by: ORTHOPAEDIC SURGERY

## 2022-10-24 DEVICE — SCREW BNE L70MM DIA6MM HEX HD DIA35MM CANC FEM TI ALLY FIX: Type: IMPLANTABLE DEVICE | Site: FEMUR | Status: FUNCTIONAL

## 2022-10-24 DEVICE — SCREW BNE L30MM DIA5MM HEX HD DIA35MM CORT TIB TI ALLY FIX: Type: IMPLANTABLE DEVICE | Site: FEMUR | Status: FUNCTIONAL

## 2022-10-24 DEVICE — IMPLANTABLE DEVICE: Type: IMPLANTABLE DEVICE | Site: FEMUR | Status: FUNCTIONAL

## 2022-10-24 DEVICE — SCREW BNE L32MM DIA5MM HEX HD DIA3.5MM CORT TIB TI ALLY FIX: Type: IMPLANTABLE DEVICE | Site: FEMUR | Status: FUNCTIONAL

## 2022-10-24 RX ORDER — ZOLPIDEM TARTRATE 10 MG/1
10 TABLET ORAL
COMMUNITY

## 2022-10-24 RX ORDER — MIDAZOLAM HYDROCHLORIDE 1 MG/ML
INJECTION, SOLUTION INTRAMUSCULAR; INTRAVENOUS AS NEEDED
Status: DISCONTINUED | OUTPATIENT
Start: 2022-10-24 | End: 2022-10-24 | Stop reason: HOSPADM

## 2022-10-24 RX ORDER — ROSUVASTATIN CALCIUM 10 MG/1
10 TABLET, COATED ORAL
COMMUNITY

## 2022-10-24 RX ORDER — SODIUM CHLORIDE, SODIUM LACTATE, POTASSIUM CHLORIDE, CALCIUM CHLORIDE 600; 310; 30; 20 MG/100ML; MG/100ML; MG/100ML; MG/100ML
1000 INJECTION, SOLUTION INTRAVENOUS CONTINUOUS
Status: DISCONTINUED | OUTPATIENT
Start: 2022-10-24 | End: 2022-10-24 | Stop reason: HOSPADM

## 2022-10-24 RX ORDER — SERTRALINE HYDROCHLORIDE 50 MG/1
100 TABLET, FILM COATED ORAL DAILY
Status: DISCONTINUED | OUTPATIENT
Start: 2022-10-25 | End: 2022-10-29 | Stop reason: HOSPADM

## 2022-10-24 RX ORDER — SODIUM CHLORIDE 9 MG/ML
125 INJECTION, SOLUTION INTRAVENOUS CONTINUOUS
Status: DISCONTINUED | OUTPATIENT
Start: 2022-10-24 | End: 2022-10-25

## 2022-10-24 RX ORDER — DEXAMETHASONE SODIUM PHOSPHATE 4 MG/ML
INJECTION, SOLUTION INTRA-ARTICULAR; INTRALESIONAL; INTRAMUSCULAR; INTRAVENOUS; SOFT TISSUE AS NEEDED
Status: DISCONTINUED | OUTPATIENT
Start: 2022-10-24 | End: 2022-10-24 | Stop reason: HOSPADM

## 2022-10-24 RX ORDER — FERROUS SULFATE, DRIED 160(50) MG
1 TABLET, EXTENDED RELEASE ORAL
Status: DISCONTINUED | OUTPATIENT
Start: 2022-10-25 | End: 2022-10-29 | Stop reason: HOSPADM

## 2022-10-24 RX ORDER — FENTANYL CITRATE 50 UG/ML
25 INJECTION, SOLUTION INTRAMUSCULAR; INTRAVENOUS
Status: DISCONTINUED | OUTPATIENT
Start: 2022-10-24 | End: 2022-10-24 | Stop reason: HOSPADM

## 2022-10-24 RX ORDER — ONDANSETRON 2 MG/ML
4 INJECTION INTRAMUSCULAR; INTRAVENOUS AS NEEDED
Status: DISCONTINUED | OUTPATIENT
Start: 2022-10-24 | End: 2022-10-24 | Stop reason: HOSPADM

## 2022-10-24 RX ORDER — POLYETHYLENE GLYCOL 3350 17 G/17G
17 POWDER, FOR SOLUTION ORAL DAILY
Status: DISCONTINUED | OUTPATIENT
Start: 2022-10-25 | End: 2022-10-29 | Stop reason: HOSPADM

## 2022-10-24 RX ORDER — ACETAMINOPHEN 325 MG/1
650 TABLET ORAL ONCE
Status: DISCONTINUED | OUTPATIENT
Start: 2022-10-24 | End: 2022-10-24 | Stop reason: HOSPADM

## 2022-10-24 RX ORDER — PHENYLEPHRINE HCL IN 0.9% NACL 0.4MG/10ML
SYRINGE (ML) INTRAVENOUS AS NEEDED
Status: DISCONTINUED | OUTPATIENT
Start: 2022-10-24 | End: 2022-10-24 | Stop reason: HOSPADM

## 2022-10-24 RX ORDER — MORPHINE SULFATE 2 MG/ML
2 INJECTION, SOLUTION INTRAMUSCULAR; INTRAVENOUS
Status: DISCONTINUED | OUTPATIENT
Start: 2022-10-24 | End: 2022-10-24 | Stop reason: HOSPADM

## 2022-10-24 RX ORDER — NALOXONE HYDROCHLORIDE 0.4 MG/ML
0.4 INJECTION, SOLUTION INTRAMUSCULAR; INTRAVENOUS; SUBCUTANEOUS AS NEEDED
Status: DISCONTINUED | OUTPATIENT
Start: 2022-10-24 | End: 2022-10-29 | Stop reason: HOSPADM

## 2022-10-24 RX ORDER — ROCURONIUM BROMIDE 10 MG/ML
INJECTION, SOLUTION INTRAVENOUS AS NEEDED
Status: DISCONTINUED | OUTPATIENT
Start: 2022-10-24 | End: 2022-10-24 | Stop reason: HOSPADM

## 2022-10-24 RX ORDER — ROSUVASTATIN CALCIUM 10 MG/1
20 TABLET, COATED ORAL
Status: DISCONTINUED | OUTPATIENT
Start: 2022-10-24 | End: 2022-10-29 | Stop reason: HOSPADM

## 2022-10-24 RX ORDER — PANTOPRAZOLE SODIUM 40 MG/1
40 TABLET, DELAYED RELEASE ORAL
Status: DISCONTINUED | OUTPATIENT
Start: 2022-10-25 | End: 2022-10-29 | Stop reason: HOSPADM

## 2022-10-24 RX ORDER — AMOXICILLIN 250 MG
1 CAPSULE ORAL 2 TIMES DAILY
Status: DISCONTINUED | OUTPATIENT
Start: 2022-10-24 | End: 2022-10-29 | Stop reason: HOSPADM

## 2022-10-24 RX ORDER — HYDROCODONE BITARTRATE AND ACETAMINOPHEN 5; 325 MG/1; MG/1
1 TABLET ORAL AS NEEDED
Status: DISCONTINUED | OUTPATIENT
Start: 2022-10-24 | End: 2022-10-24 | Stop reason: HOSPADM

## 2022-10-24 RX ORDER — DEXMEDETOMIDINE HYDROCHLORIDE 100 UG/ML
INJECTION, SOLUTION INTRAVENOUS AS NEEDED
Status: DISCONTINUED | OUTPATIENT
Start: 2022-10-24 | End: 2022-10-24 | Stop reason: HOSPADM

## 2022-10-24 RX ORDER — FENTANYL CITRATE 50 UG/ML
INJECTION, SOLUTION INTRAMUSCULAR; INTRAVENOUS AS NEEDED
Status: DISCONTINUED | OUTPATIENT
Start: 2022-10-24 | End: 2022-10-24 | Stop reason: HOSPADM

## 2022-10-24 RX ORDER — MIDAZOLAM HYDROCHLORIDE 1 MG/ML
1 INJECTION, SOLUTION INTRAMUSCULAR; INTRAVENOUS AS NEEDED
Status: DISCONTINUED | OUTPATIENT
Start: 2022-10-24 | End: 2022-10-24 | Stop reason: HOSPADM

## 2022-10-24 RX ORDER — MIDAZOLAM HYDROCHLORIDE 1 MG/ML
0.5 INJECTION, SOLUTION INTRAMUSCULAR; INTRAVENOUS
Status: DISCONTINUED | OUTPATIENT
Start: 2022-10-24 | End: 2022-10-24 | Stop reason: HOSPADM

## 2022-10-24 RX ORDER — ALBUMIN HUMAN 250 G/1000ML
SOLUTION INTRAVENOUS AS NEEDED
Status: DISCONTINUED | OUTPATIENT
Start: 2022-10-24 | End: 2022-10-24 | Stop reason: HOSPADM

## 2022-10-24 RX ORDER — ROPIVACAINE HYDROCHLORIDE 5 MG/ML
30 INJECTION, SOLUTION EPIDURAL; INFILTRATION; PERINEURAL AS NEEDED
Status: DISCONTINUED | OUTPATIENT
Start: 2022-10-24 | End: 2022-10-24 | Stop reason: HOSPADM

## 2022-10-24 RX ORDER — ALBUTEROL SULFATE 0.83 MG/ML
2.5 SOLUTION RESPIRATORY (INHALATION) AS NEEDED
Status: DISCONTINUED | OUTPATIENT
Start: 2022-10-24 | End: 2022-10-24 | Stop reason: HOSPADM

## 2022-10-24 RX ORDER — SODIUM CHLORIDE 0.9 % (FLUSH) 0.9 %
5-40 SYRINGE (ML) INJECTION AS NEEDED
Status: DISCONTINUED | OUTPATIENT
Start: 2022-10-24 | End: 2022-10-29 | Stop reason: HOSPADM

## 2022-10-24 RX ORDER — SODIUM CHLORIDE 9 MG/ML
25 INJECTION, SOLUTION INTRAVENOUS CONTINUOUS
Status: DISCONTINUED | OUTPATIENT
Start: 2022-10-24 | End: 2022-10-24 | Stop reason: HOSPADM

## 2022-10-24 RX ORDER — METOPROLOL TARTRATE 50 MG/1
50 TABLET ORAL 2 TIMES DAILY
COMMUNITY

## 2022-10-24 RX ORDER — FENTANYL CITRATE 50 UG/ML
50 INJECTION, SOLUTION INTRAMUSCULAR; INTRAVENOUS AS NEEDED
Status: DISCONTINUED | OUTPATIENT
Start: 2022-10-24 | End: 2022-10-24 | Stop reason: HOSPADM

## 2022-10-24 RX ORDER — SUCCINYLCHOLINE CHLORIDE 20 MG/ML
INJECTION INTRAMUSCULAR; INTRAVENOUS AS NEEDED
Status: DISCONTINUED | OUTPATIENT
Start: 2022-10-24 | End: 2022-10-24 | Stop reason: HOSPADM

## 2022-10-24 RX ORDER — PANTOPRAZOLE SODIUM 20 MG/1
20 TABLET, DELAYED RELEASE ORAL DAILY
COMMUNITY

## 2022-10-24 RX ORDER — DIPHENHYDRAMINE HYDROCHLORIDE 50 MG/ML
12.5 INJECTION, SOLUTION INTRAMUSCULAR; INTRAVENOUS AS NEEDED
Status: DISCONTINUED | OUTPATIENT
Start: 2022-10-24 | End: 2022-10-24 | Stop reason: HOSPADM

## 2022-10-24 RX ORDER — KETAMINE HYDROCHLORIDE 10 MG/ML
INJECTION, SOLUTION INTRAMUSCULAR; INTRAVENOUS AS NEEDED
Status: DISCONTINUED | OUTPATIENT
Start: 2022-10-24 | End: 2022-10-24 | Stop reason: HOSPADM

## 2022-10-24 RX ORDER — PROPOFOL 10 MG/ML
INJECTION, EMULSION INTRAVENOUS AS NEEDED
Status: DISCONTINUED | OUTPATIENT
Start: 2022-10-24 | End: 2022-10-24 | Stop reason: HOSPADM

## 2022-10-24 RX ORDER — SPIRONOLACTONE 50 MG/1
12.5 TABLET, FILM COATED ORAL DAILY
COMMUNITY

## 2022-10-24 RX ORDER — SODIUM CHLORIDE 0.9 % (FLUSH) 0.9 %
5-40 SYRINGE (ML) INJECTION EVERY 8 HOURS
Status: DISCONTINUED | OUTPATIENT
Start: 2022-10-24 | End: 2022-10-29 | Stop reason: HOSPADM

## 2022-10-24 RX ORDER — SODIUM CHLORIDE, SODIUM LACTATE, POTASSIUM CHLORIDE, CALCIUM CHLORIDE 600; 310; 30; 20 MG/100ML; MG/100ML; MG/100ML; MG/100ML
INJECTION, SOLUTION INTRAVENOUS
Status: DISCONTINUED | OUTPATIENT
Start: 2022-10-24 | End: 2022-10-24 | Stop reason: HOSPADM

## 2022-10-24 RX ORDER — LIDOCAINE HYDROCHLORIDE 20 MG/ML
INJECTION, SOLUTION EPIDURAL; INFILTRATION; INTRACAUDAL; PERINEURAL AS NEEDED
Status: DISCONTINUED | OUTPATIENT
Start: 2022-10-24 | End: 2022-10-24 | Stop reason: HOSPADM

## 2022-10-24 RX ORDER — GLYCOPYRROLATE 0.2 MG/ML
0.2 INJECTION INTRAMUSCULAR; INTRAVENOUS
Status: DISCONTINUED | OUTPATIENT
Start: 2022-10-24 | End: 2022-10-24 | Stop reason: HOSPADM

## 2022-10-24 RX ORDER — SPIRONOLACTONE 25 MG/1
12.5 TABLET ORAL DAILY
Status: DISCONTINUED | OUTPATIENT
Start: 2022-10-25 | End: 2022-10-29 | Stop reason: HOSPADM

## 2022-10-24 RX ORDER — LISINOPRIL 5 MG/1
5 TABLET ORAL DAILY
COMMUNITY

## 2022-10-24 RX ORDER — FACIAL-BODY WIPES
10 EACH TOPICAL DAILY PRN
Status: DISCONTINUED | OUTPATIENT
Start: 2022-10-26 | End: 2022-10-29 | Stop reason: HOSPADM

## 2022-10-24 RX ORDER — LISINOPRIL 5 MG/1
5 TABLET ORAL DAILY
Status: DISCONTINUED | OUTPATIENT
Start: 2022-10-25 | End: 2022-10-29 | Stop reason: HOSPADM

## 2022-10-24 RX ORDER — HYDROMORPHONE HYDROCHLORIDE 1 MG/ML
0.2 INJECTION, SOLUTION INTRAMUSCULAR; INTRAVENOUS; SUBCUTANEOUS
Status: DISCONTINUED | OUTPATIENT
Start: 2022-10-24 | End: 2022-10-24 | Stop reason: HOSPADM

## 2022-10-24 RX ORDER — ONDANSETRON 2 MG/ML
INJECTION INTRAMUSCULAR; INTRAVENOUS AS NEEDED
Status: DISCONTINUED | OUTPATIENT
Start: 2022-10-24 | End: 2022-10-24 | Stop reason: HOSPADM

## 2022-10-24 RX ORDER — HYDROMORPHONE HYDROCHLORIDE 2 MG/ML
INJECTION, SOLUTION INTRAMUSCULAR; INTRAVENOUS; SUBCUTANEOUS AS NEEDED
Status: DISCONTINUED | OUTPATIENT
Start: 2022-10-24 | End: 2022-10-24 | Stop reason: HOSPADM

## 2022-10-24 RX ORDER — LIDOCAINE HYDROCHLORIDE 10 MG/ML
0.1 INJECTION, SOLUTION EPIDURAL; INFILTRATION; INTRACAUDAL; PERINEURAL AS NEEDED
Status: DISCONTINUED | OUTPATIENT
Start: 2022-10-24 | End: 2022-10-24 | Stop reason: HOSPADM

## 2022-10-24 RX ADMIN — Medication 3 G: at 15:50

## 2022-10-24 RX ADMIN — ROCURONIUM BROMIDE 10 MG: 10 SOLUTION INTRAVENOUS at 16:26

## 2022-10-24 RX ADMIN — PERFLUTREN 1.5 ML: 6.52 INJECTION, SUSPENSION INTRAVENOUS at 09:48

## 2022-10-24 RX ADMIN — DEXMEDETOMIDINE HYDROCHLORIDE 10 MCG: 100 INJECTION, SOLUTION, CONCENTRATE INTRAVENOUS at 17:24

## 2022-10-24 RX ADMIN — ROCURONIUM BROMIDE 10 MG: 10 SOLUTION INTRAVENOUS at 16:39

## 2022-10-24 RX ADMIN — HYDROMORPHONE HYDROCHLORIDE 4 MG: 4 TABLET ORAL at 13:04

## 2022-10-24 RX ADMIN — SUGAMMADEX 200 MG: 100 INJECTION, SOLUTION INTRAVENOUS at 18:22

## 2022-10-24 RX ADMIN — CYCLOBENZAPRINE 10 MG: 10 TABLET, FILM COATED ORAL at 03:54

## 2022-10-24 RX ADMIN — TRAMADOL HYDROCHLORIDE 100 MG: 50 TABLET, COATED ORAL at 06:44

## 2022-10-24 RX ADMIN — ROCURONIUM BROMIDE 10 MG: 10 SOLUTION INTRAVENOUS at 16:17

## 2022-10-24 RX ADMIN — SUCCINYLCHOLINE CHLORIDE 160 MG: 20 INJECTION, SOLUTION INTRAMUSCULAR; INTRAVENOUS at 15:38

## 2022-10-24 RX ADMIN — LIDOCAINE HYDROCHLORIDE 60 MG: 20 INJECTION, SOLUTION EPIDURAL; INFILTRATION; INTRACAUDAL; PERINEURAL at 15:38

## 2022-10-24 RX ADMIN — SODIUM CHLORIDE, PRESERVATIVE FREE 10 ML: 5 INJECTION INTRAVENOUS at 23:26

## 2022-10-24 RX ADMIN — ROCURONIUM BROMIDE 10 MG: 10 SOLUTION INTRAVENOUS at 17:02

## 2022-10-24 RX ADMIN — ROSUVASTATIN CALCIUM 20 MG: 10 TABLET, COATED ORAL at 23:21

## 2022-10-24 RX ADMIN — FENTANYL CITRATE 50 MCG: 50 INJECTION, SOLUTION INTRAMUSCULAR; INTRAVENOUS at 15:57

## 2022-10-24 RX ADMIN — PROPOFOL 30 MG: 10 INJECTION, EMULSION INTRAVENOUS at 18:12

## 2022-10-24 RX ADMIN — Medication 30 MG: at 15:38

## 2022-10-24 RX ADMIN — ROCURONIUM BROMIDE 5 MG: 10 SOLUTION INTRAVENOUS at 15:38

## 2022-10-24 RX ADMIN — SODIUM CHLORIDE, POTASSIUM CHLORIDE, SODIUM LACTATE AND CALCIUM CHLORIDE: 600; 310; 30; 20 INJECTION, SOLUTION INTRAVENOUS at 15:29

## 2022-10-24 RX ADMIN — ONDANSETRON HYDROCHLORIDE 4 MG: 2 INJECTION, SOLUTION INTRAMUSCULAR; INTRAVENOUS at 17:42

## 2022-10-24 RX ADMIN — ROCURONIUM BROMIDE 25 MG: 10 SOLUTION INTRAVENOUS at 15:57

## 2022-10-24 RX ADMIN — Medication 10 MG: at 16:43

## 2022-10-24 RX ADMIN — METOPROLOL TARTRATE 50 MG: 50 TABLET ORAL at 09:05

## 2022-10-24 RX ADMIN — TRAMADOL HYDROCHLORIDE 100 MG: 50 TABLET, COATED ORAL at 23:21

## 2022-10-24 RX ADMIN — FENTANYL CITRATE 50 MCG: 50 INJECTION, SOLUTION INTRAMUSCULAR; INTRAVENOUS at 15:38

## 2022-10-24 RX ADMIN — HYDROMORPHONE HYDROCHLORIDE 2 MG: 2 TABLET ORAL at 03:55

## 2022-10-24 RX ADMIN — HYDROMORPHONE HYDROCHLORIDE 0.5 MG: 2 INJECTION, SOLUTION INTRAMUSCULAR; INTRAVENOUS; SUBCUTANEOUS at 18:45

## 2022-10-24 RX ADMIN — HYDROMORPHONE HYDROCHLORIDE 2 MG: 2 TABLET ORAL at 20:21

## 2022-10-24 RX ADMIN — TRAMADOL HYDROCHLORIDE 100 MG: 50 TABLET, COATED ORAL at 11:14

## 2022-10-24 RX ADMIN — FENTANYL CITRATE 50 MCG: 50 INJECTION, SOLUTION INTRAMUSCULAR; INTRAVENOUS at 16:44

## 2022-10-24 RX ADMIN — HYDROMORPHONE HYDROCHLORIDE 0.5 MG: 2 INJECTION, SOLUTION INTRAMUSCULAR; INTRAVENOUS; SUBCUTANEOUS at 18:44

## 2022-10-24 RX ADMIN — FENTANYL CITRATE 50 MCG: 50 INJECTION, SOLUTION INTRAMUSCULAR; INTRAVENOUS at 18:44

## 2022-10-24 RX ADMIN — Medication 80 MCG: at 15:42

## 2022-10-24 RX ADMIN — HYDROMORPHONE HYDROCHLORIDE 0.2 MG: 2 INJECTION, SOLUTION INTRAMUSCULAR; INTRAVENOUS; SUBCUTANEOUS at 18:01

## 2022-10-24 RX ADMIN — ALBUMIN (HUMAN) 12.5 G: 0.25 INJECTION, SOLUTION INTRAVENOUS at 15:46

## 2022-10-24 RX ADMIN — ACETAMINOPHEN 1000 MG: 500 TABLET ORAL at 23:21

## 2022-10-24 RX ADMIN — ACETAMINOPHEN 1000 MG: 500 TABLET ORAL at 11:41

## 2022-10-24 RX ADMIN — ZOLPIDEM TARTRATE 5 MG: 5 TABLET ORAL at 23:22

## 2022-10-24 RX ADMIN — Medication 80 MCG: at 15:45

## 2022-10-24 RX ADMIN — Medication 3 G: at 23:24

## 2022-10-24 RX ADMIN — HYDROMORPHONE HYDROCHLORIDE 0.3 MG: 2 INJECTION, SOLUTION INTRAMUSCULAR; INTRAVENOUS; SUBCUTANEOUS at 17:53

## 2022-10-24 RX ADMIN — SODIUM CHLORIDE, PRESERVATIVE FREE 10 ML: 5 INJECTION INTRAVENOUS at 06:44

## 2022-10-24 RX ADMIN — MIDAZOLAM 2 MG: 1 INJECTION INTRAMUSCULAR; INTRAVENOUS at 15:29

## 2022-10-24 RX ADMIN — Medication 40 MCG: at 15:38

## 2022-10-24 RX ADMIN — DEXAMETHASONE SODIUM PHOSPHATE 4 MG: 4 INJECTION, SOLUTION INTRAMUSCULAR; INTRAVENOUS at 16:08

## 2022-10-24 RX ADMIN — DEXMEDETOMIDINE HYDROCHLORIDE 10 MCG: 100 INJECTION, SOLUTION, CONCENTRATE INTRAVENOUS at 17:58

## 2022-10-24 RX ADMIN — ACETAMINOPHEN 1000 MG: 500 TABLET ORAL at 06:44

## 2022-10-24 RX ADMIN — PROPOFOL 150 MG: 10 INJECTION, EMULSION INTRAVENOUS at 15:38

## 2022-10-24 RX ADMIN — HYDROMORPHONE HYDROCHLORIDE 0.5 MG: 2 INJECTION, SOLUTION INTRAMUSCULAR; INTRAVENOUS; SUBCUTANEOUS at 17:22

## 2022-10-24 RX ADMIN — HYDROMORPHONE HYDROCHLORIDE 4 MG: 4 TABLET ORAL at 09:05

## 2022-10-24 RX ADMIN — PHENYLEPHRINE HYDROCHLORIDE 80 MCG/MIN: 10 INJECTION INTRAVENOUS at 15:42

## 2022-10-24 NOTE — PROGRESS NOTES
Occupational Therapy    Chart reviewed and orders acknowledged. Patient with bedrest orders and plan for surgery on this date. Will follow up POD #1 for formal OT evaluation. Thank you.     Candice Griffith, OTD, OTR/L

## 2022-10-24 NOTE — PROGRESS NOTES
[facility]                                                                                          Hospitalist Progress Note  Nixon Sanchez PA-C  Answering service: [phone] from in house phone        Date of Service:  10/24/2022  NAME:  Alex Ward  :  1960  MRN:  [id number]      Admission Summary:   Alex Ward is a 63 y.o. female with PMHx of Atrial Fibrillation (On Eliquis), SAMMIE, reported HF (repeat TTE pending 10/24), depression, and HTN/HLD who presented s/p GLF with imaging revealing acute comminuted fracture of right distal femur above the knee. Plan for ORIF with retrograde nail on 10/25/22 with Orthopedic Surgeon, Dr. Hortensia Mcleod. Interval history / Subjective:   Patient reports pain in her right leg, but that it is well controlled with the current regimen. She reports the sensation in the right leg is intact. We reviewed her home medication and she knows her medications well and they were re-ordered. Reviewed the plan and all questions and concerns addressed. Assessment & Plan:     Acute comminuted fracture of distal R femur  -- Plan for surgical intervention on 10/25/22  -- Activity: Bedrest pending OR. Parry in place. Leg immobilizer on  -- Diet: NPO. ADAT post-op  -- Further plans TBD by orthopedic surgery  -- Pain Control: Tylenol 1g q6h, currently on standing Tramadol 100mg q6h, Dilaudid 2-4mg q4h PRN, Flexeril 10mg TID PRN for muscle spasms    Atrial Fibrillation  -- Anti-arrhythmics: Continue home Metoprolol Tartrate 50mg BID. Hospitalization c/b episode of A-Fib with RVR, now controlled  -- Anti-Coagulation: Hold home Apixaban for now.  Last dose 10/21PM. Restart as able per surgery  -- On Tele    Reported hx of HF  HTN  -- Not on diuretic therapy at home  -- Continue Metoprolol, restart Lisinopril/Spirinolactone 10/25  -- Follow-up TTE 10/24    Depression  -- Restart home Zoloft 100mg daily    GERD  -- Restart home Pantoprazole daily    HLD  -- Restart home Rosuvastatin nightly     SAMMIE  -- Encourage CPAP    Insomnia  -- Ambien nightly PRN     Code status: Full Code  Prophylaxis: No pharmacologic pending ortho surgery. Will start per their recommendation  Care Plan discussed with: Pt, RN  Anticipated Disposition: OR --> Ortho joint floor     Hospital Problems  Never Reviewed            Codes Class Noted POA    Hip fracture [facility]) ICD-10-CM: A78.151G  ICD-9-CM: 820.8  10/22/2022 Unknown             Review of Systems:   A comprehensive review of systems was negative except for that written in the HPI. Vital Signs:    Last 24hrs VS reviewed since prior progress note. Most recent are:  Visit Vitals  BP (!) 160/115   Pulse 88   Temp 99.9 °F (37.7 °C)   Resp 18   Ht 5' 4\" (1.626 m)   Wt 149.7 kg (330 lb)   SpO2 91%   BMI 56.64 kg/m²         Intake/Output Summary (Last 24 hours) at 10/24/2022 1140  Last data filed at 10/24/2022 0915  Gross per 24 hour   Intake 0 ml   Output 800 ml   Net -800 ml        Physical Examination:     I had a face to face encounter with this patient and independently examined them on 10/24/2022 as outlined below:          Constitutional:  No acute distress. Lying flat in bed. Obese   ENT:  Oral mucosa moist, oropharynx benign. Resp:  CTA bilaterally. No wheezing/rhonchi/rales. No accessory muscle use. CV:  Distant heart sounds. Irregular pulse appreciated    GI:  Soft, non distended, non tender. Musculoskeletal:  No edema, warm. Palpable DP/PT pulse on the right.  Intact sensation to light touch on right    Neurologic:  Moves all extremities but deferred movement of RLE as in immobilizer  AAOx3            Data Review:    Review and/or order of clinical lab test  Review and/or order of tests in the radiology section of CPT  Review and/or order of tests in the medicine section of CPT      Labs:     Recent Labs     10/24/22  0347 10/23/22  0327   WBC 8.2 7.0   HGB 9.7* 10.6*   HCT 29.9* 32.1*   PLT 143* 142*     Recent Labs     10/24/22  0347 10/23/22  0327 10/22/22  1720   * 136  --    K 3.8 3.5  --     102  --    CO2 28 26  --    BUN 11 12  --    CREA 0.96 0.96  --    * 156*  --    CA 8.7 8.9  --    MG 1.7 1.5* 1.7   PHOS 3.2  --   --      Recent Labs     10/23/22  0327   ALT 27   AP 80   TBILI 0.8   TP 7.1   ALB 3.4*   GLOB 3.7     Recent Labs     10/23/22  0327   INR 1.0   PTP 10.6      No results for input(s): FE, TIBC, PSAT, FERR in the last 72 hours. No results found for: FOL, RBCF   No results for input(s): PH, PCO2, PO2 in the last 72 hours. No results for input(s): CPK, CKNDX, TROIQ in the last 72 hours.     No lab exists for component: CPKMB  No results found for: CHOL, CHOLX, CHLST, CHOLV, HDL, HDLP, LDL, LDLC, DLDLP, TGLX, TRIGL, TRIGP, CHHD, CHHDX  No results found for: GLUCPOC  No results found for: COLOR, APPRN, SPGRU, REFSG, SHANELLE, PROTU, GLUCU, KETU, BILU, UROU, WOLF, LEUKU, GLUKE, EPSU, BACTU, WBCU, RBCU, CASTS, UCRY      Medications Reviewed:     Current Facility-Administered Medications   Medication Dose Route Frequency    metoprolol tartrate (LOPRESSOR) tablet 50 mg  50 mg Oral BID    cyclobenzaprine (FLEXERIL) tablet 10 mg  10 mg Oral TID PRN    HYDROmorphone (DILAUDID) tablet 2 mg  2 mg Oral Q4H PRN    zolpidem (AMBIEN) tablet 5 mg  5 mg Oral QHS PRN    sodium chloride (NS) flush 5-40 mL  5-40 mL IntraVENous Q8H    sodium chloride (NS) flush 5-40 mL  5-40 mL IntraVENous PRN    polyethylene glycol (MIRALAX) packet 17 g  17 g Oral DAILY PRN    ondansetron (ZOFRAN ODT) tablet 4 mg  4 mg Oral Q8H PRN    Or    ondansetron (ZOFRAN) injection 4 mg  4 mg IntraVENous Q6H PRN    lactated Ringers infusion  75 mL/hr IntraVENous CONTINUOUS    hydrOXYzine HCL (ATARAX) tablet 10 mg  10 mg Oral TID PRN    acetaminophen (TYLENOL) tablet 1,000 mg  1,000 mg Oral Q6H    HYDROmorphone (DILAUDID) tablet 4 mg  4 mg Oral Q4H PRN    traMADoL (ULTRAM) tablet 100 mg  100 mg Oral Q6H ______________________________________________________________________  EXPECTED LENGTH OF STAY: - - -  ACTUAL LENGTH OF STAY:          2                 Nixon Sanchez PA-C

## 2022-10-24 NOTE — OP NOTES
Operative Report    Patient Name: Leticia Bentley    Patient YOB: 1960    Patient[facility] MRN: [id number]    Date of Procedure: 10/24/22    Surgical Location: [facility]    Pre-op Diagnosis: right closed displaced femoral shaft fracture    Post-op Diagnosis: right closed displaced femoral shaft fracture    Procedure: right femur open reduction retrograde intramedullary nail    Surgeon: Dane Saenz MD    Assistant/Staff: Circ-1: Lorenza Nageotte, RN  Scrub Tech-Relief: Lizzie Pa RN-2: Kervin Mccabe RN  Surg Asst-1: Kim Mckoy; Priti Gibson RN  Float Staff: Jared Rodney RN    Anesthesia: General    Preoperative IV Antibiotics: Ancef 2g    Findings: fracture reduced, implants placed. Polyethylene intact. Able to place nail without replacing polyethylene. Estimated Blood Loss: 350 ml    Implants: Margaret retrograde nail, screws    Specimens: None    Complications: None    Disposition: PACU - hemodynamically stable. Condition: stable      Indications for procedure: The patient sustained a fall and was brought to an ER where a closed, displaced femoral shaft fracture was found. She was transferred to our hospital for care. we discussed treatment options and I recommended surgical fixation by intramedullary nail. We discussed the risks, benefits, complications and convalescence regarding this treatment. The patient agreed to proceed forth with surgery. Procedure in Detail:  The patient was met in the preoperative holding area. The appropriate surgical site was marked. The patient signed a consent form and agreed to proceed forth with surgery. The patient was brought into the operating room and placed on a radiolucent operating room table. The patient was given General anesthesia. The right leg was prepped and draped in the usual sterile fashion. A multidisciplinary timeout was performed and prophylactic antibiotics were dosed.   X-ray C-arm fluoroscopy was used throughout the case for fracture reduction and implant placement. The leg was placed on a radiolucent triangle to assist in fracture reduction. I made an incision through her previous total knee arthroplasty incision. Skin flaps were raised. Made a medial parapatellar incision to sublux the patella laterally. Fat pad was excised for better visualization. We inspected the box which was opened and able to place the nail through. A long tipped guidewire was inserted and passed the fracture site to the proximal femur. The guidewire was measured for the appropriate implant length. I then sequentially reamed the femoral canal until there was appropriate chatter 1.5 mm above the selected nail size. The appropriate femoral nail was opened. It was inserted over the guidewire into the femur. The fracture was reduced using a clamp. Distal screws were drilled, measured and placed. I pulled traction through the nail in order to further reduce the fracture. Next, a perfect circles technique was used for the proximal static locking screw. I made an incision directly over this area and bluntly spread in order to arrive at anterior femur. The drill was then inserted through retracted tissue and drilled through the static locking hole. The appropriate length was measured and a screw was inserted bicortically. I inserted a second screw in dynamic placement. Final x-rays were taken confirming fracture reduction and implant placement and alignment. The wounds were copiously irrigated with normal saline. The arthrotomy was closed using #1 Vicryl suture followed by #1 PDS stratafix in locking fashion. Skin was closed using 2-0 Vicryl suture subcutaneously and staples superficially. The proximal and lateral wounds were covered with Xeroform, sterile gauze gauze and tegederm. The knee incision was covered with a Prevena wound vacuum.   The patient was then awakened from anesthesia and placed on the postoperative bed. The patient was brought to PACU in stable condition. Postoperative plan:  The patient will return to the hospital room. The right lower extremity will be toe touch weightbearing with a device. Physical therapy and Occupational Therapy will be consulted for gait training and discharge planning. DVT prophylaxis will be required for 6 weeks. The patient will follow up in the office 2 weeks after discharge.       Caitlin Singer MD     10/24/22
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
## 2022-10-24 NOTE — PROGRESS NOTES
10/24/22 1621   Family Communication   Family Update Message Procedure started   Delivery Origin Nurse    Relationship to Patient Daughter-in-law    Phone Number Terry [phone]   Family/Significant Other Update Called

## 2022-10-24 NOTE — ANESTHESIA PREPROCEDURE EVALUATION
Relevant Problems   No relevant active problems       Anesthetic History   No history of anesthetic complications            Review of Systems / Medical History  Patient summary reviewed, nursing notes reviewed and pertinent labs reviewed    Pulmonary        Sleep apnea: CPAP           Neuro/Psych   Within defined limits           Cardiovascular    Hypertension: well controlled        Dysrhythmias : atrial fibrillation      Exercise tolerance: >4 METS  Comments: S/p CV   GI/Hepatic/Renal                Endo/Other        Morbid obesity and arthritis     Other Findings            Physical Exam    Airway  Mallampati: III  TM Distance: 4 - 6 cm  Neck ROM: normal range of motion   Mouth opening: Normal     Cardiovascular  Regular rate and rhythm,  S1 and S2 normal,  no murmur, click, rub, or gallop             Dental  No notable dental hx       Pulmonary  Breath sounds clear to auscultation               Abdominal  GI exam deferred       Other Findings            Anesthetic Plan    ASA: 3  Anesthesia type: general          Induction: Intravenous  Anesthetic plan and risks discussed with: Patient

## 2022-10-24 NOTE — PROGRESS NOTES
Physical Therapy  Patient remains on bedrest and for surgery today. Will follow up post op day 1.   Paige Villalobos PT

## 2022-10-24 NOTE — PROGRESS NOTES
Progress Note    Subjective:     No acute events over night. Sara Seaman states that she is doing ok. Pain is controlled    Denies CP, SOB, nausea/vomitting, parasthesias.      Objective:   Visit Vitals  /69 (BP 1 Location: Right upper arm, BP Patient Position: At rest)   Pulse 82   Temp 98.9 °F (37.2 °C)   Resp 18   Ht 5' 4\" (1.626 m)   Wt 149.7 kg (330 lb)   SpO2 91%   BMI 56.64 kg/m²       Patient Vitals for the past 24 hrs:   Temp Pulse Resp BP SpO2   10/24/22 1414 98.9 °F (37.2 °C) 82 18 101/69 91 %   10/24/22 1400 -- 83 -- -- --   10/24/22 1200 -- 83 -- -- --   10/24/22 1157 99.2 °F (37.3 °C) 83 18 107/72 92 %   10/24/22 1000 -- 88 -- -- --   10/24/22 0927 -- -- -- (!) 160/115 --   10/24/22 0813 99.9 °F (37.7 °C) (!) 102 18 (!) 160/115 91 %   10/24/22 0600 -- 87 -- -- --   10/24/22 0505 99.3 °F (37.4 °C) 95 18 107/69 94 %   10/24/22 0400 -- 88 -- -- --   10/24/22 0200 -- 81 -- -- --   10/24/22 0020 99.6 °F (37.6 °C) 81 16 103/69 94 %   10/23/22 2319 98.8 °F (37.1 °C) 80 16 119/82 --   10/23/22 2200 -- 80 -- -- --   10/23/22 2046 98.8 °F (37.1 °C) -- 16 119/82 95 %   10/23/22 2000 -- 83 -- -- --   10/23/22 1800 -- 92 -- -- --   10/23/22 1625 99 °F (37.2 °C) 86 14 111/76 94 %        Physical Exam:  Gen: NAD, AAOx3  Resp: No acute respiratory distress  MSK:  RLE:  Dressing is clean, dry, and intact   Knee immobilizer in place  Motor intact distally  Sensation is intact to light touch distally          Intake/Output Summary (Last 24 hours) at 10/24/2022 1450  Last data filed at 10/24/2022 0915  Gross per 24 hour   Intake 0 ml   Output 800 ml   Net -800 ml       LABS :  Recent Results (from the past 24 hour(s))   METABOLIC PANEL, BASIC    Collection Time: 10/24/22  3:47 AM   Result Value Ref Range    Sodium 135 (L) 136 - 145 mmol/L    Potassium 3.8 3.5 - 5.1 mmol/L    Chloride 101 97 - 108 mmol/L    CO2 28 21 - 32 mmol/L    Anion gap 6 5 - 15 mmol/L    Glucose 172 (H) 65 - 100 mg/dL    BUN 11 6 - 20 MG/DL Creatinine 0.96 0.55 - 1.02 MG/DL    BUN/Creatinine ratio 11 (L) 12 - 20      eGFR >60 >60 ml/min/1.73m2    Calcium 8.7 8.5 - 10.1 MG/DL   MAGNESIUM    Collection Time: 10/24/22  3:47 AM   Result Value Ref Range    Magnesium 1.7 1.6 - 2.4 mg/dL   PHOSPHORUS    Collection Time: 10/24/22  3:47 AM   Result Value Ref Range    Phosphorus 3.2 2.6 - 4.7 MG/DL   CBC WITH AUTOMATED DIFF    Collection Time: 10/24/22  3:47 AM   Result Value Ref Range    WBC 8.2 3.6 - 11.0 K/uL    RBC 2.92 (L) 3.80 - 5.20 M/uL    HGB 9.7 (L) 11.5 - 16.0 g/dL    HCT 29.9 (L) 35.0 - 47.0 %    .4 (H) 80.0 - 99.0 FL    MCH 33.2 26.0 - 34.0 PG    MCHC 32.4 30.0 - 36.5 g/dL    RDW 13.7 11.5 - 14.5 %    PLATELET 574 (L) 632 - 400 K/uL    MPV 10.5 8.9 - 12.9 FL    NRBC 0.0 0  WBC    ABSOLUTE NRBC 0.00 0.00 - 0.01 K/uL    NEUTROPHILS 74 32 - 75 %    LYMPHOCYTES 14 12 - 49 %    MONOCYTES 9 5 - 13 %    EOSINOPHILS 2 0 - 7 %    BASOPHILS 0 0 - 1 %    IMMATURE GRANULOCYTES 1 (H) 0.0 - 0.5 %    ABS. NEUTROPHILS 6.0 1.8 - 8.0 K/UL    ABS. LYMPHOCYTES 1.2 0.8 - 3.5 K/UL    ABS. MONOCYTES 0.7 0.0 - 1.0 K/UL    ABS. EOSINOPHILS 0.2 0.0 - 0.4 K/UL    ABS. BASOPHILS 0.0 0.0 - 0.1 K/UL    ABS. IMM.  GRANS. 0.1 (H) 0.00 - 0.04 K/UL    DF AUTOMATED     LACTIC ACID    Collection Time: 10/24/22  4:21 AM   Result Value Ref Range    Lactic acid 2.2 (HH) 0.4 - 2.0 MMOL/L   ECHO ADULT COMPLETE    Collection Time: 10/24/22  9:46 AM   Result Value Ref Range    IVSd 1.0 (A) 0.6 - 0.9 cm    LVIDd 4.9 3.9 - 5.3 cm    LVIDs 3.0 cm    LVOT Diameter 2.0 cm    LVPWd 0.9 0.6 - 0.9 cm    LVOT Peak Gradient 7 mmHg    LVOT Peak Velocity 1.3 m/s    RVIDd 3.9 cm    RVSP 32 mmHg    RV Free Wall Peak S' 17 cm/s    LA Diameter 5.2 cm    Est. RA Pressure 10 mmHg    AV Area by Peak Velocity 2.7 cm2    AV Peak Gradient 10 mmHg    AV Peak Velocity 1.6 m/s    MV A Velocity 1.04 m/s    MV E Wave Deceleration Time 259.8 ms    MV E Velocity 1.01 m/s    LV E' Lateral Velocity 13 cm/s LV E' Septal Velocity 6 cm/s    MV PHT 75.3 ms    MV Area by PHT 2.9 cm2    PV Peak Gradient 5 mmHg    PV Max Velocity 1.2 m/s    TAPSE 2.5 1.7 cm    TR Peak Gradient 22 mmHg    TR Max Velocity 2.32 m/s    Aortic Root 3.2 cm    Fractional Shortening 2D 39 28 - 44 %    LVIDd Index 2.02 cm/m2    LVIDs Index 1.24 cm/m2    LV RWT Ratio 0.37     LV Mass 2D 164.3 (A) 67 - 162 g    LV Mass 2D Index 67.9 43 - 95 g/m2    MV E/A 0.97     E/E' Ratio (Averaged) 12.30     E/E' Lateral 7.77     E/E' Septal 16.83     LVOT Area 3.1 cm2    LA Size Index 2.15 cm/m2    LA/AO Root Ratio 1.63     Ao Root Index 1.32 cm/m2    AV Velocity Ratio 0.81     PENELOPE/BSA Peak Velocity 1.1 cm2/m2        Assessment:   Emelyn Jacobo is a 63y.o. year-old female with right Femur Fracture planning surgery    Plan:   -Weighbearing: NWB RLE  -DVT prophylaxis: SCDs. Will resume Eliquis postoperatively day 1. She is unsure her home Eliquis dosage.  -Antibiotics: Ancef preop  -Pain control: Continue as needed pain management, ice to operative area, elevate  -PT/OT for mobilization  -Dispo:  -I discussed with the patient and her family the diagnosis of the periprosthetic femur fracture and her treatment options. I recommended open reduction internal fixation using intramedullary nail. This will allow sooner weightbearing. We talked about the risk, benefits, complications, convalescence, including her elevated BMI which increases her risk of wound complications and infections. She agreed to proceed forth with surgery. A consent form was filled out. The surgical site was marked.  -N.p.o.  -Hold chemical anticoagulation  -Pain control as needed  -To the OR this afternoon      Harry Aragon MD    10/24/22  2:50 PM        Harry Aragon M.D.   [facility] Office  Cell: [phone]  Office: [phone]  Medical Staff: Foreign Gray MA

## 2022-10-24 NOTE — PERIOP NOTES
TRANSFER - OUT REPORT:    Verbal report given to Aman(name) on Lucienne Mcburney  being transferred to [facility](unit) for routine post - op       Report consisted of patients Situation, Background, Assessment and   Recommendations(SBAR). Time Pre op antibiotic given:1550  Anesthesia Stop time: 5292    Information from the following report(s) SBAR, OR Summary, Intake/Output, MAR, and Cardiac Rhythm SR.  was reviewed with the receiving nurse. Opportunity for questions and clarification was provided. Is the patient on 02? YES       L/Min 2    Is the patient on a monitor? NO    Is the nurse transporting with the patient? YES    Surgical Waiting Area notified of patient's transfer from PACU?  YES      The following personal items collected during your admission accompanied patient upon transfer:   Dental Appliance: Dental Appliances: None  Vision:    Hearing Aid:    Jewelry:    Clothing:    Other Valuables:    Valuables sent to safe:

## 2022-10-24 NOTE — DISCHARGE INSTRUCTIONS
Discharge Instructions       PATIENT ID: Selam Hunt  MRN: [id number]   YOB: 1960    DATE OF ADMISSION: 10/22/22  DATE OF DISCHARGE: 10/28/2022    PRIMARY CARE PROVIDER: [facility]     ATTENDING PHYSICIAN: Carmen Yen MD  DISCHARGING PROVIDER: Diana Fonseca NP    To contact this individual call [phone] and ask the  to page. If unavailable ask to be transferred the Adult Hospitalist Department. DISCHARGE DIAGNOSES: Right Femoral Fracture/Diaphysis/Periprosthetic, Intractable Pain Right Femur, AFIB/A Flutter(Eliquis), CKD II, Hyperglycemia, SAMMIE on CPAP, Morbid Obesity, Osteoarthritis, Anxiety    CONSULTATIONS: Ortho Sx    PROCEDURES/SURGERIES: Procedure(s):  RIGHT FEMUR RETROGRADE NAIL. FLAT SHARMAINE, TRIANGLES, C-ARM, PREVENA WOUND VAC. Lethea Canard. NEED SA.REQUEST TO FOLLOW    PENDING TEST RESULTS:   At the time of discharge the following test results are still pending: none. FOLLOW UP APPOINTMENTS:   Jaylen Hunter MD (Ortho Sx) x10-14 days    ADDITIONAL CARE RECOMMENDATIONS:   You have been deemed stable for discharge and are being discharged [facility] [address] [facility]  . You are taking ELIQUIS a blood thinner- should you develop any abnormal or prolonged bleeding- seek care at nearest ER. Should you need medication refills beyond what we have prescribed for you, you will need to contact your primary care provider for refills.     Return to the ER or notify your primary care office if you have a fever greater than 101.5 associated with chills, chest pain, or signs and symptoms of a stroke which are:  B E F A S T  -loss of balance, headaches or dizziness  -eyes blurred vision (sudden)  -asymmetrical facial symmetry (side of face droops)  -arms and leg weakness  -slurred speech / difficulty speaking  -if you experience any of these (time to call for an ambulance)      DIET: Cardiac Diet    ACTIVITY: PT/OT Eval and Treat    WOUND CARE: 10/24 Right Femur Intramedullary Nail. Continue Prevena wound VAC. At discharge: please transition unit to home unit. Keep in place after discharge for 7 days when the battery will die. Then home nursing can change to dry, sterile dressing, Ice, elevate. Follow up with Dr. Cara Castillo in office in 10-14 days. Prevena dressing to [facility] for discharge. 10.24.2022 s/p Right Femur open reduction with Prevena VAC placement over incision. Right leg wound:  Hospital vac removed and Prevena dressing attached to [address] [place] [address]. Purple foam visible with suction at 125 mmHg without leak. Education, 2  extra canisters and handouts provided to patient. EQUIPMENT needed: per PT OT recommendations. DISCHARGE MEDICATIONS:   See Medication Reconciliation Form    It is important that you take the medication exactly as they are prescribed. Keep your medication in the bottles provided by the pharmacist and keep a list of the medication names, dosages, and times to be taken in your wallet. Do not take other medications without consulting your doctor. NOTIFY YOUR PHYSICIAN FOR ANY OF THE FOLLOWING:   Fever over 101 degrees for 24 hours. Chest pain, shortness of breath, fever, chills, nausea, vomiting, diarrhea, change in mentation, falling, weakness, bleeding. Severe pain or pain not relieved by medications. Or, any other signs or symptoms that you may have questions about. DISPOSITION:    Home With:   OT  PT  HH  RN      X SNF/Inpatient Rehab/LTAC:[facility]     Independent/assisted living    Hospice    Other:     CDMP Checked: Yes X     PROBLEM LIST Updated: Yes X       Signed:   Jonelle Pedro NP  10/28/2022  4:24 PM         Dr. Erinn Calle Femur Fracture treated by Intramedullary Nail Postoperative Instructions    Follow-Up Appointment:  Follow up in the office 2 weeks after surgery.   If this appointment is not already scheduled, please call our office at ) [phone]. Activity:  Unless you have been specifically instructed otherwise, you can toe touch on the operative extremity and advance your activity as tolerated. Ambulate every hour. Use the incentive spirometer every half hour when resting. Perform your exercises as often as possible, at least 3 times a day. We will progress your weight bearing as it is appropriate. Use your ambulatory device as needed for assistance in ambulating. Please refrain from any high-level activities until we see you back at your follow up appointment. This includes golfing, exercising, and other more intense activities. Application of the ice packs will prevent and treat inflammation and reduce pain and swelling. You should ice the incisional area at least 3 times a day, 20-30 minutes at a time. Prevent any falls. Clear your living environment of rugs or floor objects that may be tripping hazards. Use an assistive device as needed for balance and support. Dressings / Wound Care:  Keep dry dressing in place until the follow-up visit. Wound vac to remain in place with home unit until one week after discharge. May then transition to dry sterile dressing. Do not apply antibiotic ointment, creams or lotions to your incision. Once your waterproof dressing has been removed, you may change bandages daily if you like or leave them open to air. These can be purchased at your local pharmacy. The sutures or staples will be removed at your 2 week follow up. If there is wound drainage, hold physical therapy, avoid vigorous activities, and contact our office [phone]. Dennis Keane / Bathing: If your incision is dry without drainage, you may shower following your discharge home. The dressing is waterproof if well affixed to skin. You may shower with the waterproof dressing in place, but please be sure it is adhered to the skin and does not allow water to get underneath.    It is fine to have water run over the incision after the waterproof dressing has been removed. Do not vigorously scrub your incision. Do not soak or submerge in a bath, pool, jacuzzi, ocean, river or lake for 6 weeks after surgery. Diet:  You may advance to your regular diet as tolerated. Proper nutrition is crucial to healing. Make sure you are eating as healthily as possible and following a balanced. protein-rich diet after your surgery. Avoid processed foods and eat fruits and vegetables. Medications: It is our goal to keep you as comfortable as possible after your surgery. Please take your medications as prescribed without exceeding the recommended dosage. It is also important to understand that pain has a cycle. It begins and increases until medication interrupts it. The aim of good pain control is to stop the pain before it becomes intolerable. The key is to stay ahead of the pain. We encourage patients to discontinue opioid pain medications as soon as possible after surgery. The side effects of these medications can be substantial, and the narcotic medications are not mandatory. You may substitute a prescribed narcotic with over-the-counter Tylenol. Tylenol should not exceed 4000mg in a 24 hour period, including if it is in the pain medication. Pain medications may cause constipation. Over-the-counter Colace twice daily and Miralax while taking the narcotic medication should help prevent constipation. Other side effects of pain medication include dizziness, headache, nausea, vomiting, and urinary retention. Discontinue the pain medication if you develop itching, rash, shortness of breath, or difficulties swallowing. If these symptoms become severe or are not relieved by discontinuing the medication, you should seek immediate medical attention. Refills of pain medication are authorized during office hours only (8 AM- 4 PM Monday through Friday).  Our office will not prescribe narcotics beyond 6 weeks from the date of your surgery. Narcotics will not be called into the pharmacy, and, in most cases, you must be seen clinically. Blood Thinners: You will be given a prescription for Lovenox injections, Aspirin or Xarelto based on your health history. If you are on a blood thinner prior to surgery, they will continue following surgery for prophylaxis. You should take these medications as prescribed for 4 weeks following your surgery. Driving: You should not return to driving until you are off all narcotic pain medications and able to safely and quickly apply the brakes. This is normally 2-4 weeks for left sided surgery and 6-12 weeks for right-sided surgery. Airports and metal detectors:  ID cards are no longer given after joint replacement, as they are not accepted by [employer] security. Most joint replacements do not set off metal detectors. If the detector is set off, just tell the  you have a joint replacement. Signs/Symptoms of Concern:   Contact Dr. Faustina Flores office if any of the following signs or symptoms develop. Please be advised if a problem arises which you feel requires immediate medical attention you should seek medical attention at the closest ER. Temperature greater than 101.5 for more than 8 hrs  Fever and/or chills that persist for greater than 8 hr. A sudden increase in pain/swelling/ or tenderness in the back of your calf or thigh that is not relieved with ice/elevation and pain medication. Increased drainage from your incision, increased redness or warmth at the area of your incision or a sudden increase in swelling or redness that persists despite ice and elevation      If you have questions or concerns, please call Dr. Faustina Flores staff    Office: Phone [phone]  Fax [phone]    Office Address: Saravanan Herman M.D.     [address] [place] [address]    Saravanan Herman MD      10/24/22

## 2022-10-24 NOTE — PROGRESS NOTES
Occupational Therapy  10.24.2022    Chart reviewed in prep for OT evaluation - noted patient with orders and plan to see POD #1. Patient currently POD #0 for R IM nailing following hip fx. Will defer and follow-up as able and appropriate. Thank you. Danica Bernabe MS, OTR/L

## 2022-10-25 LAB
ANION GAP SERPL CALC-SCNC: 9 MMOL/L (ref 5–15)
BASOPHILS # BLD: 0 K/UL (ref 0–0.1)
BASOPHILS NFR BLD: 0 % (ref 0–1)
BUN SERPL-MCNC: 12 MG/DL (ref 6–20)
BUN/CREAT SERPL: 13 (ref 12–20)
CALCIUM SERPL-MCNC: 8.6 MG/DL (ref 8.5–10.1)
CHLORIDE SERPL-SCNC: 98 MMOL/L (ref 97–108)
CO2 SERPL-SCNC: 25 MMOL/L (ref 21–32)
CREAT SERPL-MCNC: 0.9 MG/DL (ref 0.55–1.02)
DIFFERENTIAL METHOD BLD: ABNORMAL
EOSINOPHIL # BLD: 0 K/UL (ref 0–0.4)
EOSINOPHIL NFR BLD: 0 % (ref 0–7)
ERYTHROCYTE [DISTWIDTH] IN BLOOD BY AUTOMATED COUNT: 13.5 % (ref 11.5–14.5)
GLUCOSE BLD STRIP.AUTO-MCNC: 208 MG/DL (ref 65–117)
GLUCOSE BLD STRIP.AUTO-MCNC: 227 MG/DL (ref 65–117)
GLUCOSE BLD STRIP.AUTO-MCNC: 285 MG/DL (ref 65–117)
GLUCOSE SERPL-MCNC: 257 MG/DL (ref 65–100)
HCT VFR BLD AUTO: 25.1 % (ref 35–47)
HGB BLD-MCNC: 8.4 G/DL (ref 11.5–16)
IMM GRANULOCYTES # BLD AUTO: 0.1 K/UL (ref 0–0.04)
IMM GRANULOCYTES NFR BLD AUTO: 1 % (ref 0–0.5)
LYMPHOCYTES # BLD: 0.5 K/UL (ref 0.8–3.5)
LYMPHOCYTES NFR BLD: 4 % (ref 12–49)
MAGNESIUM SERPL-MCNC: 1.5 MG/DL (ref 1.6–2.4)
MCH RBC QN AUTO: 33.2 PG (ref 26–34)
MCHC RBC AUTO-ENTMCNC: 33.5 G/DL (ref 30–36.5)
MCV RBC AUTO: 99.2 FL (ref 80–99)
MONOCYTES # BLD: 0.7 K/UL (ref 0–1)
MONOCYTES NFR BLD: 6 % (ref 5–13)
NEUTS SEG # BLD: 10.7 K/UL (ref 1.8–8)
NEUTS SEG NFR BLD: 89 % (ref 32–75)
NRBC # BLD: 0 K/UL (ref 0–0.01)
NRBC BLD-RTO: 0 PER 100 WBC
PHOSPHATE SERPL-MCNC: 2.2 MG/DL (ref 2.6–4.7)
PLATELET # BLD AUTO: 156 K/UL (ref 150–400)
PMV BLD AUTO: 10.8 FL (ref 8.9–12.9)
POTASSIUM SERPL-SCNC: 4.1 MMOL/L (ref 3.5–5.1)
RBC # BLD AUTO: 2.53 M/UL (ref 3.8–5.2)
RBC MORPH BLD: ABNORMAL
SERVICE CMNT-IMP: ABNORMAL
SODIUM SERPL-SCNC: 132 MMOL/L (ref 136–145)
WBC # BLD AUTO: 12 K/UL (ref 3.6–11)

## 2022-10-25 PROCEDURE — 74011636637 HC RX REV CODE- 636/637: Performed by: PHYSICIAN ASSISTANT

## 2022-10-25 PROCEDURE — 85025 COMPLETE CBC W/AUTO DIFF WBC: CPT

## 2022-10-25 PROCEDURE — 74011250637 HC RX REV CODE- 250/637: Performed by: PHYSICIAN ASSISTANT

## 2022-10-25 PROCEDURE — 2709999900 HC NON-CHARGEABLE SUPPLY

## 2022-10-25 PROCEDURE — 74011000258 HC RX REV CODE- 258: Performed by: ORTHOPAEDIC SURGERY

## 2022-10-25 PROCEDURE — 83735 ASSAY OF MAGNESIUM: CPT

## 2022-10-25 PROCEDURE — 65270000046 HC RM TELEMETRY

## 2022-10-25 PROCEDURE — 74011250637 HC RX REV CODE- 250/637: Performed by: HOSPITALIST

## 2022-10-25 PROCEDURE — 97530 THERAPEUTIC ACTIVITIES: CPT

## 2022-10-25 PROCEDURE — 5A09357 ASSISTANCE WITH RESPIRATORY VENTILATION, LESS THAN 24 CONSECUTIVE HOURS, CONTINUOUS POSITIVE AIRWAY PRESSURE: ICD-10-PCS | Performed by: INTERNAL MEDICINE

## 2022-10-25 PROCEDURE — 74011250637 HC RX REV CODE- 250/637: Performed by: ORTHOPAEDIC SURGERY

## 2022-10-25 PROCEDURE — 97110 THERAPEUTIC EXERCISES: CPT

## 2022-10-25 PROCEDURE — 80048 BASIC METABOLIC PNL TOTAL CA: CPT

## 2022-10-25 PROCEDURE — 84100 ASSAY OF PHOSPHORUS: CPT

## 2022-10-25 PROCEDURE — 74011000250 HC RX REV CODE- 250: Performed by: INTERNAL MEDICINE

## 2022-10-25 PROCEDURE — 97535 SELF CARE MNGMENT TRAINING: CPT

## 2022-10-25 PROCEDURE — 82962 GLUCOSE BLOOD TEST: CPT

## 2022-10-25 PROCEDURE — 36415 COLL VENOUS BLD VENIPUNCTURE: CPT

## 2022-10-25 PROCEDURE — 97166 OT EVAL MOD COMPLEX 45 MIN: CPT

## 2022-10-25 PROCEDURE — 74011250637 HC RX REV CODE- 250/637: Performed by: NURSE PRACTITIONER

## 2022-10-25 PROCEDURE — 94660 CPAP INITIATION&MGMT: CPT

## 2022-10-25 PROCEDURE — 77030028907 HC WRP KNEE WO BGS SOLM -B

## 2022-10-25 PROCEDURE — 97161 PT EVAL LOW COMPLEX 20 MIN: CPT

## 2022-10-25 PROCEDURE — 74011250636 HC RX REV CODE- 250/636: Performed by: ORTHOPAEDIC SURGERY

## 2022-10-25 RX ORDER — INSULIN LISPRO 100 [IU]/ML
INJECTION, SOLUTION INTRAVENOUS; SUBCUTANEOUS
Status: DISCONTINUED | OUTPATIENT
Start: 2022-10-25 | End: 2022-10-29 | Stop reason: HOSPADM

## 2022-10-25 RX ORDER — MAGNESIUM SULFATE 100 %
4 CRYSTALS MISCELLANEOUS AS NEEDED
Status: DISCONTINUED | OUTPATIENT
Start: 2022-10-25 | End: 2022-10-29 | Stop reason: HOSPADM

## 2022-10-25 RX ORDER — MAGNESIUM SULFATE 100 %
4 CRYSTALS MISCELLANEOUS AS NEEDED
Status: DISCONTINUED | OUTPATIENT
Start: 2022-10-25 | End: 2022-10-25 | Stop reason: SDUPTHER

## 2022-10-25 RX ADMIN — ZOLPIDEM TARTRATE 5 MG: 5 TABLET ORAL at 22:27

## 2022-10-25 RX ADMIN — APIXABAN 5 MG: 5 TABLET, FILM COATED ORAL at 17:17

## 2022-10-25 RX ADMIN — OYSTER SHELL CALCIUM WITH VITAMIN D 1 TABLET: 500; 200 TABLET, FILM COATED ORAL at 12:07

## 2022-10-25 RX ADMIN — LISINOPRIL 5 MG: 5 TABLET ORAL at 09:55

## 2022-10-25 RX ADMIN — ACETAMINOPHEN 1000 MG: 500 TABLET ORAL at 12:07

## 2022-10-25 RX ADMIN — ACETAMINOPHEN 1000 MG: 500 TABLET ORAL at 17:17

## 2022-10-25 RX ADMIN — SERTRALINE 100 MG: 50 TABLET, FILM COATED ORAL at 09:56

## 2022-10-25 RX ADMIN — Medication 3 G: at 08:18

## 2022-10-25 RX ADMIN — OYSTER SHELL CALCIUM WITH VITAMIN D 1 TABLET: 500; 200 TABLET, FILM COATED ORAL at 17:17

## 2022-10-25 RX ADMIN — ROSUVASTATIN CALCIUM 20 MG: 10 TABLET, COATED ORAL at 22:25

## 2022-10-25 RX ADMIN — ACETAMINOPHEN 1000 MG: 500 TABLET ORAL at 23:35

## 2022-10-25 RX ADMIN — SODIUM CHLORIDE, PRESERVATIVE FREE 10 ML: 5 INJECTION INTRAVENOUS at 22:30

## 2022-10-25 RX ADMIN — HYDROMORPHONE HYDROCHLORIDE 4 MG: 4 TABLET ORAL at 23:35

## 2022-10-25 RX ADMIN — HYDROMORPHONE HYDROCHLORIDE 4 MG: 4 TABLET ORAL at 14:54

## 2022-10-25 RX ADMIN — Medication 2 UNITS: at 22:28

## 2022-10-25 RX ADMIN — SENNOSIDES AND DOCUSATE SODIUM 1 TABLET: 50; 8.6 TABLET ORAL at 17:16

## 2022-10-25 RX ADMIN — SENNOSIDES AND DOCUSATE SODIUM 1 TABLET: 50; 8.6 TABLET ORAL at 09:57

## 2022-10-25 RX ADMIN — ACETAMINOPHEN 1000 MG: 500 TABLET ORAL at 05:36

## 2022-10-25 RX ADMIN — Medication 5 UNITS: at 12:32

## 2022-10-25 RX ADMIN — SPIRONOLACTONE 12.5 MG: 25 TABLET ORAL at 09:57

## 2022-10-25 RX ADMIN — METOPROLOL TARTRATE 50 MG: 50 TABLET ORAL at 17:17

## 2022-10-25 RX ADMIN — PANTOPRAZOLE SODIUM 40 MG: 40 TABLET, DELAYED RELEASE ORAL at 08:17

## 2022-10-25 RX ADMIN — OYSTER SHELL CALCIUM WITH VITAMIN D 1 TABLET: 500; 200 TABLET, FILM COATED ORAL at 08:17

## 2022-10-25 RX ADMIN — APIXABAN 5 MG: 5 TABLET, FILM COATED ORAL at 05:36

## 2022-10-25 RX ADMIN — HYDROMORPHONE HYDROCHLORIDE 4 MG: 4 TABLET ORAL at 02:09

## 2022-10-25 RX ADMIN — Medication 3 UNITS: at 17:18

## 2022-10-25 RX ADMIN — TRAMADOL HYDROCHLORIDE 100 MG: 50 TABLET, COATED ORAL at 12:07

## 2022-10-25 RX ADMIN — HYDROMORPHONE HYDROCHLORIDE 4 MG: 4 TABLET ORAL at 08:17

## 2022-10-25 RX ADMIN — HYDROMORPHONE HYDROCHLORIDE 4 MG: 4 TABLET ORAL at 19:27

## 2022-10-25 RX ADMIN — TRAMADOL HYDROCHLORIDE 100 MG: 50 TABLET, COATED ORAL at 05:36

## 2022-10-25 RX ADMIN — METOPROLOL TARTRATE 50 MG: 50 TABLET ORAL at 09:56

## 2022-10-25 NOTE — PROGRESS NOTES
Physician Progress Note      PATIENT:               Arvin Wilburn  CSN #:                  [id number]  :                       1960  ADMIT DATE:       10/22/2022 2:11 PM  DISCH DATE:  RESPONDING  PROVIDER #:        CASSIE LANG PA-C        QUERY TEXT:    Stage of Chronic Kidney Disease: Please provide further specificity, if known. Clinical indicators include: chronic kidney disease  Options provided:  -- Chronic kidney disease stage 1  -- Chronic kidney disease stage 2  -- Chronic kidney disease stage 3  -- Chronic kidney disease stage 3a  -- Chronic kidney disease stage 3b  -- Chronic kidney disease stage 4  -- Chronic kidney disease stage 5  -- Chronic kidney disease stage 5, requiring dialysis  -- End stage renal disease  -- Other - I will add my own diagnosis  -- Disagree - Not applicable / Not valid  -- Disagree - Clinically Unable to determine / Unknown        PROVIDER RESPONSE TEXT:    The patient has chronic kidney disease stage 1. QUERY TEXT:    Patient admitted with fracture of Right femur, noted to have chronic atrial fibrillation and is maintained on eliquis. If possible, please document in progress notes and discharge summary if you are evaluating and/or treating any of the following: The medical record reflects the following:  Risk Factors: hypercoagulable  A fib    Clinical Indicators:  PN 10/22  Chronic Atrial Flutter  Atrial Fibrillation with RVR  Hold home eliquis      Treatment:    Hold home eliquis      Thank you,  Lia Chavez RN CDI [facility]  Clinical Documentation  [phone] or via Perfect Serve  Options provided:  -- Secondary hypercoagulable state in a patient with atrial fibrillation  -- Other - I will add my own diagnosis  -- Disagree - Not applicable / Not valid  -- Disagree - Clinically unable to determine / Unknown  -- Refer to Clinical Documentation Reviewer    PROVIDER RESPONSE TEXT:    Provider disagreed with this query.   A fib is for stroke prevention    Query created by: Adamaris Torres on 10/24/2022 11:50 AM      QUERY TEXT:    Pt admitted with Fx femur and has CHF documented. If possible, please document in progress notes and discharge summary further specificity regarding the type and acuity of CHF:    The medical record reflects the following:    Risk Factors: CHF    Clinical Indicators:  H&P 10/22  Congestive heart failure unknown systolic or diastolic    ECHO 71/98  Left? Ventricle: Normal left ventricular systolic function with a visually estimated EF of 55 - 60%. Left ventricle size is normal. Normal wall thickness. Unable to assess wall motion. Normal diastolic function.  -  Right? Ventricle: Not well visualized. Right ventricle size is normal.    Treatment:  metoprolol tartrate (LOPRESSOR) tablet 50 mg    Thank you,  Jacklyn Yeung RN [facility]  Clinical Documentation  [phone] or via Perfect Serve  Options provided:  -- Chronic Systolic CHF/HFrEF  -- Chronic Diastolic CHF/HFpEF  -- Chronic Systolic and Diastolic CHF  -- Other - I will add my own diagnosis  -- Disagree - Not applicable / Not valid  -- Disagree - Clinically unable to determine / Unknown  -- Refer to Clinical Documentation Reviewer    PROVIDER RESPONSE TEXT:    Provider is clinically unable to determine a response to this query.   I do not have unformation from outside hospital records about heart failure history so difficult to make diagnosis    Query created by: Adamaris Torres on 10/24/2022 11:58 AM      Electronically signed by:  Christine Garrett PA-C 10/25/2022 1:42 PM

## 2022-10-25 NOTE — PROGRESS NOTES
Progress Note    Status Post: right Femur Intramedullary nail  Date of Surgery: 10/24/22  Surgeon: Dr. Jonathan Rubio    Subjective:     No acute events over night. Annette Britt states that she is doing ok. She has a different kind of pain within the leg. She has some apprehension about moving her leg but I provided reassurance. Denies CP, SOB, nausea/vomitting, parasthesias. Objective:   Visit Vitals  /75 (BP 1 Location: Right lower arm, BP Patient Position: Semi fowlers)   Pulse 87   Temp 97.9 °F (36.6 °C)   Resp 16   Ht 5' 4\" (1.626 m)   Wt 149.7 kg (330 lb)   SpO2 97%   BMI 56.64 kg/m²       Patient Vitals for the past 24 hrs:   Temp Pulse Resp BP SpO2   10/25/22 0603 -- 87 -- -- --   10/25/22 0400 -- 90 -- -- --   10/25/22 0209 97.9 °F (36.6 °C) 89 16 137/75 97 %   10/25/22 0205 -- 89 -- -- --   10/25/22 0041 -- -- -- -- 97 %   10/24/22 2331 98.2 °F (36.8 °C) 87 16 124/82 96 %   10/24/22 2225 98 °F (36.7 °C) 86 16 123/70 97 %   10/24/22 2159 -- 84 -- -- --   10/24/22 2125 97.9 °F (36.6 °C) 90 16 139/84 90 %   10/24/22 2000 97.9 °F (36.6 °C) 86 14 (!) 100/50 92 %   10/24/22 1930 -- 78 13 123/84 96 %   10/24/22 1915 98.1 °F (36.7 °C) 88 14 (!) 120/55 94 %   10/24/22 1900 -- 76 10 129/77 97 %   10/24/22 1855 -- 80 10 (!) 123/46 94 %   10/24/22 1850 98.2 °F (36.8 °C) 76 11 (!) 118/91 93 %   10/24/22 1845 -- 86 14 (!) 115/50 98 %   10/24/22 1840 98.4 °F (36.9 °C) 80 13 111/63 95 %   10/24/22 1457 97.8 °F (36.6 °C) 85 18 116/68 92 %   10/24/22 1414 98.9 °F (37.2 °C) 82 18 101/69 91 %   10/24/22 1400 -- 83 -- -- --   10/24/22 1200 -- 83 -- -- --   10/24/22 1157 99.2 °F (37.3 °C) 83 18 107/72 92 %   10/24/22 1000 -- 88 -- -- --   10/24/22 0927 -- -- -- (!) 160/115 --        Physical Exam:  Gen: NAD, AAOx3  Resp: No acute respiratory distress  MSK:  RLE:  Dressings clean, dry, and intact  Wound vacuum was unconnected in the extension cord and the wound vacuum was off. It was plug-in and restarted.   It is holding suction well without leak.   Motor intact distally  Sensation is intact to light touch distally                Intake/Output Summary (Last 24 hours) at 10/25/2022 0814  Last data filed at 10/25/2022 0544  Gross per 24 hour   Intake 1150 ml   Output 2000 ml   Net -850 ml       LABS :  Recent Results (from the past 24 hour(s))   ECHO ADULT COMPLETE    Collection Time: 10/24/22  9:46 AM   Result Value Ref Range    IVSd 1.0 (A) 0.6 - 0.9 cm    LVIDd 4.9 3.9 - 5.3 cm    LVIDs 3.0 cm    LVOT Diameter 2.0 cm    LVPWd 0.9 0.6 - 0.9 cm    LVOT Peak Gradient 7 mmHg    LVOT Peak Velocity 1.3 m/s    RVIDd 3.9 cm    RVSP 32 mmHg    RV Free Wall Peak S' 17 cm/s    LA Diameter 5.2 cm    Est. RA Pressure 10 mmHg    AV Area by Peak Velocity 2.7 cm2    AV Peak Gradient 10 mmHg    AV Peak Velocity 1.6 m/s    MV A Velocity 1.04 m/s    MV E Wave Deceleration Time 259.8 ms    MV E Velocity 1.01 m/s    LV E' Lateral Velocity 13 cm/s    LV E' Septal Velocity 6 cm/s    MV PHT 75.3 ms    MV Area by PHT 2.9 cm2    PV Peak Gradient 5 mmHg    PV Max Velocity 1.2 m/s    TAPSE 2.5 1.7 cm    TR Peak Gradient 22 mmHg    TR Max Velocity 2.32 m/s    Aortic Root 3.2 cm    Fractional Shortening 2D 39 28 - 44 %    LVIDd Index 2.02 cm/m2    LVIDs Index 1.24 cm/m2    LV RWT Ratio 0.37     LV Mass 2D 164.3 (A) 67 - 162 g    LV Mass 2D Index 67.9 43 - 95 g/m2    MV E/A 0.97     E/E' Ratio (Averaged) 12.30     E/E' Lateral 7.77     E/E' Septal 16.83     LVOT Area 3.1 cm2    LA Size Index 2.15 cm/m2    LA/AO Root Ratio 1.63     Ao Root Index 1.32 cm/m2    AV Velocity Ratio 0.81     PENELOPE/BSA Peak Velocity 1.1 cm2/m2   GLUCOSE, POC    Collection Time: 10/24/22  3:30 PM   Result Value Ref Range    Glucose (POC) 142 (H) 65 - 117 mg/dL    Performed by Media Hammans    MAGNESIUM    Collection Time: 10/25/22  3:48 AM   Result Value Ref Range    Magnesium 1.5 (L) 1.6 - 2.4 mg/dL   PHOSPHORUS    Collection Time: 10/25/22  3:48 AM   Result Value Ref Range Phosphorus 2.2 (L) 2.6 - 4.7 MG/DL   CBC WITH AUTOMATED DIFF    Collection Time: 10/25/22  3:48 AM   Result Value Ref Range    WBC 12.0 (H) 3.6 - 11.0 K/uL    RBC 2.53 (L) 3.80 - 5.20 M/uL    HGB 8.4 (L) 11.5 - 16.0 g/dL    HCT 25.1 (L) 35.0 - 47.0 %    MCV 99.2 (H) 80.0 - 99.0 FL    MCH 33.2 26.0 - 34.0 PG    MCHC 33.5 30.0 - 36.5 g/dL    RDW 13.5 11.5 - 14.5 %    PLATELET 679 086 - 612 K/uL    MPV 10.8 8.9 - 12.9 FL    NRBC 0.0 0  WBC    ABSOLUTE NRBC 0.00 0.00 - 0.01 K/uL    NEUTROPHILS 89 (H) 32 - 75 %    LYMPHOCYTES 4 (L) 12 - 49 %    MONOCYTES 6 5 - 13 %    EOSINOPHILS 0 0 - 7 %    BASOPHILS 0 0 - 1 %    IMMATURE GRANULOCYTES 1 (H) 0.0 - 0.5 %    ABS. NEUTROPHILS 10.7 (H) 1.8 - 8.0 K/UL    ABS. LYMPHOCYTES 0.5 (L) 0.8 - 3.5 K/UL    ABS. MONOCYTES 0.7 0.0 - 1.0 K/UL    ABS. EOSINOPHILS 0.0 0.0 - 0.4 K/UL    ABS. BASOPHILS 0.0 0.0 - 0.1 K/UL    ABS. IMM.  GRANS. 0.1 (H) 0.00 - 0.04 K/UL    DF SMEAR SCANNED      RBC COMMENTS MACROCYTOSIS  1+       METABOLIC PANEL, BASIC    Collection Time: 10/25/22  3:48 AM   Result Value Ref Range    Sodium 132 (L) 136 - 145 mmol/L    Potassium 4.1 3.5 - 5.1 mmol/L    Chloride 98 97 - 108 mmol/L    CO2 25 21 - 32 mmol/L    Anion gap 9 5 - 15 mmol/L    Glucose 257 (H) 65 - 100 mg/dL    BUN 12 6 - 20 MG/DL    Creatinine 0.90 0.55 - 1.02 MG/DL    BUN/Creatinine ratio 13 12 - 20      eGFR >60 >60 ml/min/1.73m2    Calcium 8.6 8.5 - 10.1 MG/DL        Assessment:   Ken Abarca is a 63y.o. year-old female with right Femur Fracture status post Intramedullary nail by myself POD # 1    Plan:   -Weighbearing: TTWB RLE  -DVT prophylaxis: SCDs, frequent ambulation, Eliquis twice daily resuming today  -Antibiotics: for 24h  -Pain control: Continue as needed pain management, ice to operative area, elevate  -PT/OT for mobilization  -Dispo: Discharge planning to Home when ready  Follow up with me in office in 10-14 days        Susie Martínez MD    10/25/22  8:14 AM        [facility] King Sabino Yee M.D.   [address] [facility] Office  Cell: [phone]  Office: [phone]  Medical Staff: Chema Srivastava MA

## 2022-10-25 NOTE — PROGRESS NOTES
Problem: Self Care Deficits Care Plan (Adult)  Goal: *Acute Goals and Plan of Care (Insert Text)  Description: FUNCTIONAL STATUS PRIOR TO ADMISSION: Patient was modified independent using a single point cane for functional mobility. Hx of falls. Reports owning a WC and has a ramp as well. Mainly wears slip on shoes. HOME SUPPORT PRIOR TO ADMISSION: The patient lived with roommate but did not require assist. Patient lives on first floor of a two story home with 3 steps and bilateral rails to enter. Room mate lives with her but is disabled: can not help her physically, but can assist with IADLs (errands, groceries, etc). Occupational Therapy Goals  Initiated 10/25/2022  1. Patient will perform lower body dressing with moderate assistance and LRAD within 7 day(s). 2.  Patient will perform upper body dressing with minimal assistance/contact guard assist within 7 day(s). 3.  Patient will perform bathing with moderate assistance and LRAD within 7 day(s). 4.  Patient will perform toilet transfers with maximal assistance x1 within 7 day(s). 5.  Patient will perform all aspects of toileting with maximal assistance within 7 day(s). 6.  Patient will participate in upper extremity therapeutic exercise/activities with minimal assistance/contact guard assist for 5 minutes within 7 day(s). 7.  Patient will utilize energy conservation techniques during functional activities with verbal cues within 7 day(s). Outcome: Progressing Towards Goal   OCCUPATIONAL THERAPY EVALUATION  Patient: Holland Jules (89 y.o. female)  Date: 10/25/2022  Primary Diagnosis: Hip fracture ([facility] [S72.009A]  Procedure(s) (LRB):  RIGHT FEMUR RETROGRADE NAIL. FLAT SHARMAINE, TRIANGLES, C-ARM, PREVENA WOUND VAC. Les Horsfall. NEED SA.REQUEST TO FOLLOW (Right) 1 Day Post-Op   Precautions:  Fall, TTWB (right LE/needs bariatric equipment)    ASSESSMENT  Based on the objective data described below, the patient is s/p GLF resulting in R IMN POD #1 and presents with impaired balance, generalized weakness, poor pain mgmt, impaired balance, decreased endurance, and poor adherence to TTWB prec. Significant increased time required 2/2 poor pain mgmt and increased weakness, requiring up to max assist x3. Patient received with damp gown, agreeable to bathing and linen change. Following patient completed stand pivot transfer from EOB > recliner chair, despite increased assistance and multimodal cueing to offload RLE patient unable to maintain TTWB status requiring assist x3 to facilitate pivot. Remained seated in recliner chair, BLEs elevated, all needs met. Patient is high fall risk and well below baseline LOF, recommend IPR at DC to maximize safety and independence levels     Current Level of Function Impacting Discharge (ADLs/self-care):   Feeding: Setup    Oral Facial Hygiene/Grooming: Setup    Bathing: Total assistance      Upper Body Dressing: Maximum assistance    Lower Body Dressing: Total assistance    Toileting: Total assistance    Functional Outcome Measure: The patient scored Total: 25/100 on the Barthel Index outcome measure which is indicative of being dependent in basic self-care. Other factors to consider for discharge: below baseline, TTWB     Patient will benefit from skilled therapy intervention to address the above noted impairments. PLAN :  Recommendations and Planned Interventions: self care training, functional mobility training, therapeutic exercise, balance training, therapeutic activities, endurance activities, patient education, home safety training, and family training/education    Frequency/Duration: Patient will be followed by occupational therapy 5 times a week to address goals.     Recommendation for discharge: (in order for the patient to meet his/her long term goals)  Therapy 3 hours per day 5-7 days per week    This discharge recommendation:  Has been made in collaboration with the attending provider and/or case management    IF patient discharges home will need the following DME: AE: long handled bathing, AE: long handled dressing, shower chair, and walker: rolling (will need bariatric equip)       SUBJECTIVE:   Patient stated this would be so much easier if I wasn't so sticky.  re: damp linen/gown    OBJECTIVE DATA SUMMARY:   HISTORY:   Past Medical History:   Diagnosis Date    Arrhythmia     Heart failure ([address]     Hypertension     Sleep apnea    History reviewed. No pertinent surgical history.     Expanded or extensive additional review of patient history:     Home Situation  Home Environment: Private residence  # Steps to Enter: 3  Rails to Enter: Yes  Hand Rails : Bilateral  Wheelchair Ramp: Yes  One/Two Story Residence: Two story, live on 1st floor (basement is optional-doesn't go down there)  Lift Chair Available: No  Living Alone: No (roommamte but is disabled, can help with ADLs)  Support Systems: Friend/Neighbor (adult children 30 minutes away but work full time)  Patient Expects to be Discharged to[de-identified] Rehab hospital/unit acute  Current DME Used/Available at Home: Cane, straight, Wheelchair, Shower chair  Tub or Shower Type: Tub/Shower combination (has not been showering, just washing up)    Hand dominance: Right    EXAMINATION OF PERFORMANCE DEFICITS:  Cognitive/Behavioral Status:  Neurologic State: Alert  Orientation Level: Oriented X4  Cognition: Appropriate decision making  Perception: Appears intact  Perseveration: No perseveration noted  Safety/Judgement: Awareness of environment    Skin: redness and open sores on back/backside, noted drainage on domonique pads    Edema: BLE edema      Vision/Perceptual:                           Acuity: Within Defined Limits         Range of Motion:    AROM: Generally decreased, functional  PROM: Generally decreased, functional                      Strength:    Strength: Generally decreased, functional                Coordination:  Coordination: Generally decreased, functional  Fine Motor Skills-Upper: Left Intact; Right Intact    Gross Motor Skills-Upper: Left Intact; Right Intact    Tone & Sensation:    Tone: Normal  Sensation: Intact                      Balance:  Sitting: Impaired; Without support  Sitting - Static: Good (unsupported)  Sitting - Dynamic: Fair (occasional)  Standing: Impaired; With support  Standing - Static: Fair;Constant support  Standing - Dynamic : Poor;Constant support    Functional Mobility and Transfers for ADLs:  Bed Mobility:  Supine to Sit: Moderate assistance;Maximum assistance;Assist x2; Additional time; Adaptive equipment;Bed Modified (Complete support for RLE/use of bed rail/HOB elevated/assist to move trunk)  Sit to Supine:  (Left up in drop arm recliner for transfer back to bed)    Transfers:  Sit to Stand: Maximum assistance;Assist x2;Adaptive equipment (RW)  Stand to Sit: Maximum assistance;Assist x2  Bed to Chair: Maximum assistance (Ax3)  Assistive Device : Walker, rolling    ADL Assessment:  Feeding: Setup    Oral Facial Hygiene/Grooming: Setup    Bathing: Total assistance         Upper Body Dressing: Maximum assistance    Lower Body Dressing: Total assistance    Toileting: Total assistance                ADL Intervention and task modifications:       Grooming  Grooming Assistance: Set-up; Supervision  Position Performed: Seated in chair       Upper [address]: Minimum  assistance    Lower Body Dressing Assistance  Dressing Assistance: Total assistance(dependent)  Socks: Total assistance (dependent)         Cognitive Retraining  Safety/Judgement: Awareness of environment    Functional Measure:    Barthel Index:  Bathin  Bladder: 0 (Indwelling Parry)  Bowels: 5  Groomin  Dressin  Feeding: 10  Mobility: 0  Stairs: 0  Toilet Use: 0  Transfer (Bed to Chair and Back): 5  Total: 25/100      The Barthel ADL Index: Guidelines  1.  The index should be used as a record of what a patient does, not as a record of what a patient could do. 2. The main aim is to establish degree of independence from any help, physical or verbal, however minor and for whatever reason. 3. The need for supervision renders the patient not independent. 4. A patient's performance should be established using the best available evidence. Asking the patient, friends/relatives and nurses are the usual sources, but direct observation and common sense are also important. However direct testing is not needed. 5. Usually the patient's performance over the preceding 24-48 hours is important, but occasionally longer periods will be relevant. 6. Middle categories imply that the patient supplies over 50 per cent of the effort. 7. Use of aids to be independent is allowed. Score Interpretation (from [address] [facility])    Independent   60-79 Minimally independent   40-59 Partially dependent   20-39 Very dependent   <20 Totally dependent     -Nain Montero., Barthel, SGW. (1965). Functional evaluation: the Barthel Index. [address] [facility] ([address] (1997). The Barthel activities of daily living index: self-reporting versus actual performance in the old (> or = 75 years). Journal of [address] [place] [address] 45(7), [facility], .[facility]., [facility]., [facility]. (1999). Measuring the change in disability after inpatient rehabilitation; comparison of the responsiveness of the Barthel Index and Functional Josephine Measure. Journal of Neurology, Neurosurgery, and Psychiatry, 66(4), 108-583. Clemencia Gilford, N.J.A, Tanner Ramos  W.J.M, & Eder Avendano M.A. (2004) Assessment of post-stroke quality of life in cost-effectiveness studies: The usefulness of the Barthel Index and the EuroQoL-5D.  Quality of Life Research, 15, 822-80     Occupational Therapy Evaluation Charge Determination   History Examination Decision-Making   MEDIUM Complexity : Expanded review of history including physical, cognitive and psychosocial  history  HIGH Complexity : 5 or more performance deficits relating to physical, cognitive , or psychosocial skils that result in activity limitations and / or participation restrictions MEDIUM Complexity : Patient may present with comorbidities that affect occupational performnce. Miniml to moderate modification of tasks or assistance (eg, physical or verbal ) with assesment(s) is necessary to enable patient to complete evaluation       Based on the above components, the patient evaluation is determined to be of the following complexity level: HIGH   Pain Rating:  Pt endorsing 10/10 pain with activity. Activity Tolerance:   Poor and requires rest breaks    After treatment patient left in no apparent distress:    Sitting in chair and Call bell within reach    COMMUNICATION/EDUCATION:   The patients plan of care was discussed with: Physical therapist, Occupational therapist, and Registered nurse. Patient/family have participated as able in goal setting and plan of care. This patients plan of care is appropriate for delegation to [facility].     Thank you for this referral.  Marium Willams OT  Time Calculation: 56 mins

## 2022-10-25 NOTE — PROGRESS NOTES
[address] Adult  Hospitalist Group                                                                                          Hospitalist Progress Note  Nixon Sanchez PA-C  Answering service: [phone] from in house phone        Date of Service:  10/25/2022  NAME:  Dylan Fair  :  1960  MRN:  [id number]      Admission Summary:   Dylan Fair is a 63 y.o. female with PMHx of Atrial Fibrillation (On Eliquis), SAMMIE, reported HF (repeat TTE 10/24 with LVEF of 55-60% with normal systolic/diastolic function), depression, and HTN/HLD who presented s/p GLF with imaging revealing acute comminuted fracture of right distal femur above the knee. Now s/p right femur closed reduction retrograde intramedullary nail 10/25/22 with Orthopedic Surgeon, Dr. Taqueria James. Interval history / Subjective:   Patient seen and examined this morning and overall reports feeling well. She notes the orthopedic surgery team is addressing any questions/concerns she has. She notes some hesitation with being out of bed, and she feels comfortable with her current position in the chair. We reviewed the plan and all questions and concerns addressed. Assessment & Plan:     Acute comminuted fracture of distal R femur  -- Appreciate Orthopedic Surgery involvement  -- Now s/p right femur closed reduction retrograde intramedullary nail 10/25/22 with Orthopedic Surgeon, Dr. Taqueria James. -- Activity: TTWB RLE  -- PT/OT consulted and appreciate their assistance  -- Pain Control: Tylenol 1g q6h, stop standing Tramadol 100mg q6h, Dilaudid 2-4mg q4h PRN, Flexeril 10mg TID PRN for muscle spasms  -- After discharge will need follow-up in 10-14 days with Dr. Taqueria James     Atrial Fibrillation  -- Anti-arrhythmics: Continue home Metoprolol Tartrate 50mg BID. Hospitalization c/b episode of A-Fib with RVR, now controlled  -- Anti-Coagulation: Home regimen includes Apixaban.  Restarted POD#1 with Orthopedic surgery clearance  -- On Tele     Reported hx of HF  HTN  -- Not on diuretic therapy at home  -- Continue Metoprolol, restart Lisinopril/Spirinolactone 10/25  -- TTE on 10/24 with LVEF of 55-60% with normal systolic function and normal diastolic function. No significant valvular abnormalities appreciated    Hyperglycemia  -- Post-operatively her glucose level now elevated with advancement of diet. -- Does not carry a diagnosis of diabetes to my understanding. Does not take any medications on a daily basis for diabetes when I reviewed with her 10/24  -- Start lispro sliding scale with POCT glucose AC HS  -- Check Hgb A1c on 10/26/22     Depression  -- Continue home Zoloft 100mg daily     GERD  -- Continue home Pantoprazole daily     HLD  -- Continue home Rosuvastatin nightly      SAMMIE  Morbid obesity  -- Encourage CPAP  -- Encourage weight loss     Insomnia  -- Ambien nightly PRN     Code status: Full Code  Prophylaxis: Apixaban restarted POD#1  Care Plan discussed with: Pt, RN  Anticipated Disposition: TBD     Hospital Problems  Never Reviewed            Codes Class Noted POA    Hip fracture ([facility] ICD-10-CM: S72.009A  ICD-9-CM: 820.8  10/22/2022 Unknown             Review of Systems:   A comprehensive review of systems was negative except for that written in the HPI. Vital Signs:    Last 24hrs VS reviewed since prior progress note. Most recent are:  Visit Vitals  BP (!) 150/76 (BP 1 Location: Right lower arm, BP Patient Position: At rest)   Pulse 71   Temp 98.4 °F (36.9 °C)   Resp 16   Ht 5' 4\" (1.626 m)   Wt 149.7 kg (330 lb)   SpO2 98%   BMI 56.64 kg/m²         Intake/Output Summary (Last 24 hours) at 10/25/2022 1415  Last data filed at 10/25/2022 0844  Gross per 24 hour   Intake 1390 ml   Output 2000 ml   Net -610 ml        Physical Examination:     I had a face to face encounter with this patient and independently examined them on 10/25/2022 as outlined below:          Constitutional:  No acute distress.  Sitting in chair with legs extended. Obese   ENT:  Oral mucosa moist, oropharynx benign. Resp:  CTA bilaterally. No wheezing/rhonchi/rales. No accessory muscle use. CV:  Distant heart sounds. Irregular pulse appreciated    GI:  Soft, non distended, non tender. Musculoskeletal:  No edema, warm. Neurologic:  Moves all extremities spontaneously.  AAOx3  Skin: Surgical site in the right hip/leg with dressing that is c/d/I and not saturated on rounds  : Parry with clear/yellow urine            Data Review:    Review and/or order of clinical lab test  Review and/or order of tests in the radiology section of CPT  Review and/or order of tests in the medicine section of CPT      Labs:     Recent Labs     10/25/22  0348 10/24/22  0347   WBC 12.0* 8.2   HGB 8.4* 9.7*   HCT 25.1* 29.9*    143*     Recent Labs     10/25/22  0348 10/24/22  0347 10/23/22  0327   * 135* 136   K 4.1 3.8 3.5   CL 98 101 102   CO2 25 28 26   BUN 12 11 12   CREA 0.90 0.96 0.96   * 172* 156*   CA 8.6 8.7 8.9   MG 1.5* 1.7 1.5*   PHOS 2.2* 3.2  --      Recent Labs     10/23/22  0327   ALT 27   AP 80   TBILI 0.8   TP 7.1   ALB 3.4*   GLOB 3.7     Recent Labs     10/23/22  0327   INR 1.0   PTP 10.6      Lab Results   Component Value Date/Time    Glucose (POC) 285 (H) 10/25/2022 12:27 PM    Glucose (POC) 142 (H) 10/24/2022 03:30 PM         Medications Reviewed:     Current Facility-Administered Medications   Medication Dose Route Frequency    insulin lispro (HUMALOG) injection   SubCUTAneous AC&HS    dextrose 10 % infusion 0-250 mL  0-250 mL IntraVENous PRN    glucose chewable tablet 16 g  4 Tablet Oral PRN    glucagon (GLUCAGEN) injection 1 mg  1 mg IntraMUSCular PRN    dextrose 10 % infusion 0-250 mL  0-250 mL IntraVENous PRN    spironolactone (ALDACTONE) tablet 12.5 mg  12.5 mg Oral DAILY    pantoprazole (PROTONIX) tablet 40 mg  40 mg Oral ACB    rosuvastatin (CRESTOR) tablet 20 mg  20 mg Oral QHS    sertraline (ZOLOFT) tablet 100 mg  100 mg Oral DAILY    lisinopriL (PRINIVIL, ZESTRIL) tablet 5 mg  5 mg Oral DAILY    0.9% sodium chloride infusion  125 mL/hr IntraVENous CONTINUOUS    sodium chloride (NS) flush 5-40 mL  5-40 mL IntraVENous Q8H    sodium chloride (NS) flush 5-40 mL  5-40 mL IntraVENous PRN    naloxone (NARCAN) injection 0.4 mg  0.4 mg IntraVENous PRN    calcium-vitamin D (OS-HELEN +D3) 500 mg-200 unit per tablet 1 Tablet  1 Tablet Oral TID WITH MEALS    senna-docusate (PERICOLACE) 8.6-50 mg per tablet 1 Tablet  1 Tablet Oral BID    polyethylene glycol (MIRALAX) packet 17 g  17 g Oral DAILY    [START ON 10/26/2022] bisacodyL (DULCOLAX) suppository 10 mg  10 mg Rectal DAILY PRN    apixaban (ELIQUIS) tablet 5 mg  5 mg Oral BID    metoprolol tartrate (LOPRESSOR) tablet 50 mg  50 mg Oral BID    cyclobenzaprine (FLEXERIL) tablet 10 mg  10 mg Oral TID PRN    HYDROmorphone (DILAUDID) tablet 2 mg  2 mg Oral Q4H PRN    zolpidem (AMBIEN) tablet 5 mg  5 mg Oral QHS PRN    sodium chloride (NS) flush 5-40 mL  5-40 mL IntraVENous Q8H    sodium chloride (NS) flush 5-40 mL  5-40 mL IntraVENous PRN    polyethylene glycol (MIRALAX) packet 17 g  17 g Oral DAILY PRN    ondansetron (ZOFRAN ODT) tablet 4 mg  4 mg Oral Q8H PRN    Or    ondansetron (ZOFRAN) injection 4 mg  4 mg IntraVENous Q6H PRN    hydrOXYzine HCL (ATARAX) tablet 10 mg  10 mg Oral TID PRN    acetaminophen (TYLENOL) tablet 1,000 mg  1,000 mg Oral Q6H    HYDROmorphone (DILAUDID) tablet 4 mg  4 mg Oral Q4H PRN    traMADoL (ULTRAM) tablet 100 mg  100 mg Oral Q6H     ______________________________________________________________________  EXPECTED LENGTH OF STAY: 2d 19h  ACTUAL LENGTH OF STAY:          3                 Nixon Sanchez PA-C

## 2022-10-25 NOTE — PROGRESS NOTES
Problem: Mobility Impaired (Adult and Pediatric)  Goal: *Acute Goals and Plan of Care (Insert Text)  Description: FUNCTIONAL STATUS PRIOR TO ADMISSION: Patient was modified independent using a single point cane for functional mobility. HOME SUPPORT PRIOR TO ADMISSION: The patient lived with roommate but did not require assist. Patient lives on first floor of a two story home with 3 steps and bilateral rails to enter. Room mate lives with her but is disabled: can not help her physically, but can assist with IADLs (errands, groceries, etc). Physical Therapy Goals  Initiated 10/25/2022    1. Patient will move from supine to sit and sit to supine , scoot up and down, and roll side to side in bed with minimal assistance within 7 days. 2. Patient will perform sit to stand and chair transfer with RW and minimal assistance within 7 days. 3. Patient will perform post-op hip exercise home exercise program per protocol with independence within 7 days. Outcome: Progressing Towards Goal   PHYSICAL THERAPY EVALUATION  Patient: Lucienne Mcburney (89 y.o. female)  Date: 10/25/2022  Primary Diagnosis: Hip fracture ([address] [S72.009A]  Procedure(s) (LRB):  RIGHT FEMUR RETROGRADE NAIL. FLAT SHARMAINE, TRIANGLES, C-ARM, PREVENA WOUND VAC. Subha Balboa. NEED SA.REQUEST TO FOLLOW (Right) 1 Day Post-Op   Precautions:   Fall, TTWB (right LE/needs bariatric equipment)    ASSESSMENT  Based on the objective data described below, the patient presents with  impairment in functional mobility, activity tolerance and balance s/p fall resulting in periprosthetic fracture resulting in ORIF, IM nail to right femur. PLOF: Modified Independent with cane, able to drive and perform ADLs, but was no longer showering (bathed at sink). Lives on first floor of two story home with 3 steps and bilateral rails to enter. Roommate lives with her, is disabled, unable to physically assist patient but can assist with IADLS (errands, groceries, etc).  Patient presents in bed. Started on post-op hip exercises and encouragement to move BOTH LEs. Performed bed mobility, sat on edge of bed with RLE supported on trash can. Performed grooming, hygiene activities. Performed sit-stand at [facility], then attempted stand-pivot with RW and gait belt from bed to recliner at bed side. Despite verbal and physical cueing (therapist foot underneath patient's right foot), patient was unable to maintain touch touch weight bearing through RLE. Patient left up in recliner, LEs elevated and ice placed on knee, call bell. Patient instructed to call nurse when she is ready to return to bed. Spoke with nurse and recommended Lift Team return her back to bed (is in a drop arm recliner with a sheet underneath). Current Level of Function Impacting Discharge (mobility/balance): Moderate-maximal assistance of 2 for supine-sit, required constant support for RLE, HOB elevated, use of bed rail, additional time. Maximal assistance of 2 for sit-stand, stand-sit, 3rd person to support RLE when sitting. Functional Outcome Measure: The patient scored 25/100 on the Barthel outcome measure which is indicative of maximal impaired ability to care for basic self-needs/dependency on others. Other factors to consider for discharge: Motivated/A & O x 4/No Outside Support/Far from Modified Independent PLOF/Steps to enter the home/TTWB'ing RLE . Patient will benefit from skilled therapy intervention to address the above noted impairments. PLAN :  Recommendations and Planned Interventions: bed mobility training, transfer training, gait training, therapeutic exercises, patient and family training/education, and therapeutic activities      Frequency/Duration: Patient will be followed by physical therapy:  daily to address goals.     Recommendation for discharge: (in order for the patient to meet his/her long term goals)  Therapy 3 hours per day 5-7 days per week due to high Modified Independent PLOF    This discharge recommendation:  Has been made in collaboration with the attending provider and/or case management    IF patient discharges home will need the following DME: bedside commode and rolling walker         SUBJECTIVE:   Patient stated I don't know if I can do anything. I haven't moved since Friday.     OBJECTIVE DATA SUMMARY:   HISTORY:    Past Medical History:   Diagnosis Date    Arrhythmia     Heart failure ([facility] [address]     Hypertension     Sleep apnea    History reviewed. No pertinent surgical history. Personal factors and/or comorbidities impacting plan of care:  Motivated/A & O x 4/No Outside Support/Far from Modified Independent PLOF/Steps to enter the home/TTWB'ing RLE     Home Situation  Home Environment: Private residence  # Steps to Enter: 3  Rails to Enter: Yes  Hand Rails : Bilateral  Wheelchair Ramp: Yes  One/Two Story Residence: Two story, live on 1st floor (basement is optional-doesn't go down there)  Lift Chair Available: No  Living Alone: No (roommamte but is disabled, can help with ADLs)  Support Systems: Friend/Neighbor (adult children 30 minutes away but work full time)  Patient Expects to be Discharged to[de-identified] Skilled nursing facility  Current DME Used/Available at Home: [place], straight, Wheelchair, [address] chair  Tub or Shower Type: Tub/Shower combination (has not been showering, just washing up)    EXAMINATION/PRESENTATION/DECISION MAKING:   Critical Behavior:  Neurologic State: Alert  Orientation Level: Oriented X4  Cognition: Appropriate decision making  Safety/Judgement: Awareness of environment  Hearing:       Range Of Motion:  AROM: Generally decreased, functional           PROM: Generally decreased, functional           Strength:    Strength: Generally decreased, functional                    Tone & Sensation:   Tone: Normal              Sensation: Intact               Coordination:  Coordination: Generally decreased, functional  Vision:      Functional Mobility:  Bed Mobility:     Supine to Sit: Moderate assistance;Maximum assistance;Assist x2; Additional time; Adaptive equipment;Bed Modified (Complete support for RLE/use of bed rail/HOB elevated/assist to move trunk)  Sit to Supine:  (Left up in drop arm recliner for transfer back to bed)     Transfers:  Sit to Stand: Maximum assistance;Assist x2;Adaptive equipment (RW)  Stand to Sit: Maximum assistance;Assist x2  Stand Pivot Transfers: Maximum assistance;Assist x2;(Other) comment (a third person to manage RLE)                    Balance:   Sitting: Impaired; Without support  Sitting - Static: Good (unsupported)  Sitting - Dynamic: Fair (occasional)  Standing: Impaired; With support  Standing - Static: Fair;Constant support  Standing - Dynamic : Poor;Constant support  Ambulation/Gait Training:                    Right Side Weight Bearing: Toe touch  Left Side Weight Bearing: As tolerated                            Therapeutic Exercises: Ankle Pumps  Ham Sets  Quad Sets  Glute Sets  Heel Slides  X 10 each, 5-7x daily      Functional Measure:  Barthel Index:    Bathin  Bladder: 0 (Indwelling Parry)  Bowels: 5  Groomin  Dressin  Feeding: 10  Mobility: 0  Stairs: 0  Toilet Use: 0  Transfer (Bed to Chair and Back): 5  Total: 25/100       The Barthel ADL Index: Guidelines  1. The index should be used as a record of what a patient does, not as a record of what a patient could do. 2. The main aim is to establish degree of independence from any help, physical or verbal, however minor and for whatever reason. 3. The need for supervision renders the patient not independent. 4. A patient's performance should be established using the best available evidence. Asking the patient, friends/relatives and nurses are the usual sources, but direct observation and common sense are also important. However direct testing is not needed.   5. Usually the patient's performance over the preceding 24-48 hours is important, but occasionally longer periods will be relevant. 6. Middle categories imply that the patient supplies over 50 per cent of the effort. 7. Use of aids to be independent is allowed. Score Interpretation (from [address])    Independent   60-79 Minimally independent   40-59 Partially dependent   20-39 Very dependent   <20 Totally dependent     -Evangelina Montero, Barthel, D.W. (1965). Functional evaluation: the Barthel Index. [address] ([address] (1997). The Barthel activities of daily living index: self-reporting versus actual performance in the old (> or = 75 years). Journal of [facility] 45(7), 14 [facility], SUMEET, Brett Bear., Amelia Britton. (1999). Measuring the change in disability after inpatient rehabilitation; comparison of the responsiveness of the Barthel Index and Functional [place] Measure. Journal of Neurology, Neurosurgery, and Psychiatry, 66(4), 399-297. Daniel Tejeda, [place].CHRISTINA, ASHLEY Sousa, & George Mccarthy, M.A. (2004) Assessment of post-stroke quality of life in cost-effectiveness studies: The usefulness of the Barthel Index and the EuroQoL-5D.  Quality of Life Research, 15, 043-92           Physical Therapy Evaluation Charge Determination   History Examination Presentation Decision-Making   MEDIUM  Complexity : 1-2 comorbidities / personal factors will impact the outcome/ POC  MEDIUM Complexity : 3 Standardized tests and measures addressing body structure, function, activity limitation and / or participation in recreation  MEDIUM Complexity : Evolving with changing characteristics  LOW Complexity : FOTO score of       Based on the above components, the patient evaluation is determined to be of the following complexity level: LOW     Pain Rating:  3/10 at rest, 7/10 when up    Activity Tolerance:   Fair: spO2 >93% on room air after activity    After treatment patient left in no apparent distress:   Sitting in chair, Call bell within reach, and nurse notified/tray in front of patient; Patient instructed to call nurse when ready to go back to bed. COMMUNICATION/EDUCATION:   The patients plan of care was discussed with: Occupational therapist, Registered nurse, Physician, Case management, and Rehabilitation technician. Fall prevention education was provided and the patient/caregiver indicated understanding., Patient/family have participated as able in goal setting and plan of care. , and Patient/family agree to work toward stated goals and plan of care.     Thank you for this referral.  Cece Lam   Time Calculation: 60 mins

## 2022-10-25 NOTE — PROGRESS NOTES
Bedside and Verbal shift change report given to Gayla Lester RN (oncoming nurse) by Mando Nelson RN (offgoing nurse). Report included the following information SBAR, Procedure Summary, Intake/Output, MAR, Accordion, and Recent Results.

## 2022-10-25 NOTE — PROGRESS NOTES
Transition Of Care: IPR referrals pending. BLS vs family to transport when stable for discharge. Insurance Iris Likens will be required. Attending NP agrees with plan. RUR: 11%    Referrals pending with:    [facility]: Phone: [phone], fax: [phone]. CM spoke with Favian Quiros and faxed clinicals. [facility]: Sent via [address]. CM following for discharge needs.     Joe Perez RN/CRM

## 2022-10-25 NOTE — ANESTHESIA POSTPROCEDURE EVALUATION
Post-Anesthesia Evaluation and Assessment    Patient: Caroline Morrison MRN: [id number]  SSN: [id number]    YOB: 1960  Age: 63 y.o. Sex: female      I have evaluated the patient and they are stable and ready for discharge from the PACU. Cardiovascular Function/Vital Signs  Visit Vitals  BP (!) 150/76 (BP 1 Location: Right lower arm, BP Patient Position: At rest)   Pulse 91   Temp 36.9 °C (98.4 °F)   Resp 16   Ht 5' 4\" (1.626 m)   Wt 149.7 kg (330 lb)   SpO2 98%   BMI 56.64 kg/m²       Patient is status post General anesthesia for Procedure(s):  RIGHT FEMUR RETROGRADE NAIL. FLAT SHARMAINE, TRIANGLES, C-ARM, PREVENA WOUND VAC. Sari Tucker. NEED SA.REQUEST TO FOLLOW. Nausea/Vomiting: None    Postoperative hydration reviewed and adequate. Pain:  Pain Scale 1: Numeric (0 - 10) (10/25/22 0839)  Pain Intensity 1: 4 (10/25/22 0839)   Managed    Neurological Status:   Neuro (WDL): Within Defined Limits (10/24/22 1915)  Neuro  Neurologic State: Alert (10/25/22 1000)  Orientation Level: Oriented X4 (10/25/22 1000)  Cognition: Appropriate decision making (10/25/22 1000)  LUE Motor Response: Purposeful (10/25/22 0900)  LLE Motor Response: Purposeful (10/25/22 0900)  RUE Motor Response: Purposeful (10/25/22 0900)  RLE Motor Response: Purposeful;Weak (10/25/22 0900)   At baseline    Mental Status, Level of Consciousness: Alert and  oriented to person, place, and time    Pulmonary Status:   O2 Device: None (Room air) (10/25/22 1018)   Adequate oxygenation and airway patent    Complications related to anesthesia: None    Post-anesthesia assessment completed. No concerns    Signed By: Miranda Gutierrez MD     October 25, 2022              Procedure(s):  RIGHT FEMUR RETROGRADE NAIL. FLAT SHARMAINE, TRIANGLES, C-ARM, PREVENA WOUND VAC. Sari Tucker. NEED SA.REQUEST TO FOLLOW.    general    <BSHSIANPOST>    INITIAL Post-op Vital signs:   Vitals Value Taken Time   /84 10/24/22 1930   Temp 36.7 °C (98.1 °F) 10/24/22 1915   Pulse 87 10/24/22 1935   Resp 19 10/24/22 1935   SpO2 98 % 10/24/22 1935   Vitals shown include unvalidated device data.

## 2022-10-26 LAB
ANION GAP SERPL CALC-SCNC: 9 MMOL/L (ref 5–15)
BASOPHILS # BLD: 0 K/UL (ref 0–0.1)
BASOPHILS NFR BLD: 0 % (ref 0–1)
BUN SERPL-MCNC: 14 MG/DL (ref 6–20)
BUN/CREAT SERPL: 15 (ref 12–20)
CALCIUM SERPL-MCNC: 8.6 MG/DL (ref 8.5–10.1)
CHLORIDE SERPL-SCNC: 100 MMOL/L (ref 97–108)
CO2 SERPL-SCNC: 25 MMOL/L (ref 21–32)
CREAT SERPL-MCNC: 0.91 MG/DL (ref 0.55–1.02)
DIFFERENTIAL METHOD BLD: ABNORMAL
EOSINOPHIL # BLD: 0.1 K/UL (ref 0–0.4)
EOSINOPHIL NFR BLD: 1 % (ref 0–7)
ERYTHROCYTE [DISTWIDTH] IN BLOOD BY AUTOMATED COUNT: 13.7 % (ref 11.5–14.5)
EST. AVERAGE GLUCOSE BLD GHB EST-MCNC: 134 MG/DL
GLUCOSE BLD STRIP.AUTO-MCNC: 156 MG/DL (ref 65–117)
GLUCOSE BLD STRIP.AUTO-MCNC: 160 MG/DL (ref 65–117)
GLUCOSE BLD STRIP.AUTO-MCNC: 165 MG/DL (ref 65–117)
GLUCOSE BLD STRIP.AUTO-MCNC: 169 MG/DL (ref 65–117)
GLUCOSE SERPL-MCNC: 169 MG/DL (ref 65–100)
HBA1C MFR BLD: 6.3 % (ref 4–5.6)
HCT VFR BLD AUTO: 21.1 % (ref 35–47)
HCT VFR BLD AUTO: 24.6 % (ref 35–47)
HGB BLD-MCNC: 6.9 G/DL (ref 11.5–16)
HGB BLD-MCNC: 8.2 G/DL (ref 11.5–16)
HISTORY CHECKED?,CKHIST: NORMAL
IMM GRANULOCYTES # BLD AUTO: 0.1 K/UL (ref 0–0.04)
IMM GRANULOCYTES NFR BLD AUTO: 1 % (ref 0–0.5)
LYMPHOCYTES # BLD: 1 K/UL (ref 0.8–3.5)
LYMPHOCYTES NFR BLD: 10 % (ref 12–49)
MAGNESIUM SERPL-MCNC: 1.8 MG/DL (ref 1.6–2.4)
MCH RBC QN AUTO: 32.9 PG (ref 26–34)
MCHC RBC AUTO-ENTMCNC: 32.7 G/DL (ref 30–36.5)
MCV RBC AUTO: 100.5 FL (ref 80–99)
MONOCYTES # BLD: 0.9 K/UL (ref 0–1)
MONOCYTES NFR BLD: 9 % (ref 5–13)
NEUTS SEG # BLD: 7.5 K/UL (ref 1.8–8)
NEUTS SEG NFR BLD: 79 % (ref 32–75)
NRBC # BLD: 0.03 K/UL (ref 0–0.01)
NRBC BLD-RTO: 0.3 PER 100 WBC
PHOSPHATE SERPL-MCNC: 1.9 MG/DL (ref 2.6–4.7)
PLATELET # BLD AUTO: 169 K/UL (ref 150–400)
PMV BLD AUTO: 10.8 FL (ref 8.9–12.9)
POTASSIUM SERPL-SCNC: 3.6 MMOL/L (ref 3.5–5.1)
RBC # BLD AUTO: 2.1 M/UL (ref 3.8–5.2)
RBC MORPH BLD: ABNORMAL
RBC MORPH BLD: ABNORMAL
SERVICE CMNT-IMP: ABNORMAL
SODIUM SERPL-SCNC: 134 MMOL/L (ref 136–145)
WBC # BLD AUTO: 9.6 K/UL (ref 3.6–11)

## 2022-10-26 PROCEDURE — 83735 ASSAY OF MAGNESIUM: CPT

## 2022-10-26 PROCEDURE — 74011250637 HC RX REV CODE- 250/637: Performed by: ORTHOPAEDIC SURGERY

## 2022-10-26 PROCEDURE — 86923 COMPATIBILITY TEST ELECTRIC: CPT

## 2022-10-26 PROCEDURE — 74011250637 HC RX REV CODE- 250/637: Performed by: NURSE PRACTITIONER

## 2022-10-26 PROCEDURE — 36415 COLL VENOUS BLD VENIPUNCTURE: CPT

## 2022-10-26 PROCEDURE — 82962 GLUCOSE BLOOD TEST: CPT

## 2022-10-26 PROCEDURE — 86900 BLOOD TYPING SEROLOGIC ABO: CPT

## 2022-10-26 PROCEDURE — 80048 BASIC METABOLIC PNL TOTAL CA: CPT

## 2022-10-26 PROCEDURE — 94760 N-INVAS EAR/PLS OXIMETRY 1: CPT

## 2022-10-26 PROCEDURE — P9016 RBC LEUKOCYTES REDUCED: HCPCS

## 2022-10-26 PROCEDURE — 74011000250 HC RX REV CODE- 250: Performed by: ORTHOPAEDIC SURGERY

## 2022-10-26 PROCEDURE — 85025 COMPLETE CBC W/AUTO DIFF WBC: CPT

## 2022-10-26 PROCEDURE — 74011636637 HC RX REV CODE- 636/637: Performed by: PHYSICIAN ASSISTANT

## 2022-10-26 PROCEDURE — 94660 CPAP INITIATION&MGMT: CPT

## 2022-10-26 PROCEDURE — 84100 ASSAY OF PHOSPHORUS: CPT

## 2022-10-26 PROCEDURE — 74011250637 HC RX REV CODE- 250/637: Performed by: HOSPITALIST

## 2022-10-26 PROCEDURE — 83036 HEMOGLOBIN GLYCOSYLATED A1C: CPT

## 2022-10-26 PROCEDURE — 77010033678 HC OXYGEN DAILY

## 2022-10-26 PROCEDURE — 30233N1 TRANSFUSION OF NONAUTOLOGOUS RED BLOOD CELLS INTO PERIPHERAL VEIN, PERCUTANEOUS APPROACH: ICD-10-PCS | Performed by: INTERNAL MEDICINE

## 2022-10-26 PROCEDURE — 85018 HEMOGLOBIN: CPT

## 2022-10-26 PROCEDURE — 74011000250 HC RX REV CODE- 250: Performed by: INTERNAL MEDICINE

## 2022-10-26 PROCEDURE — 74011250637 HC RX REV CODE- 250/637: Performed by: PHYSICIAN ASSISTANT

## 2022-10-26 PROCEDURE — 74011250636 HC RX REV CODE- 250/636: Performed by: INTERNAL MEDICINE

## 2022-10-26 PROCEDURE — 65270000046 HC RM TELEMETRY

## 2022-10-26 PROCEDURE — 36430 TRANSFUSION BLD/BLD COMPNT: CPT

## 2022-10-26 RX ORDER — SODIUM CHLORIDE 9 MG/ML
250 INJECTION, SOLUTION INTRAVENOUS AS NEEDED
Status: DISCONTINUED | OUTPATIENT
Start: 2022-10-26 | End: 2022-10-29 | Stop reason: HOSPADM

## 2022-10-26 RX ADMIN — APIXABAN 5 MG: 5 TABLET, FILM COATED ORAL at 17:01

## 2022-10-26 RX ADMIN — ACETAMINOPHEN 1000 MG: 500 TABLET ORAL at 06:06

## 2022-10-26 RX ADMIN — Medication 2 UNITS: at 13:06

## 2022-10-26 RX ADMIN — OYSTER SHELL CALCIUM WITH VITAMIN D 1 TABLET: 500; 200 TABLET, FILM COATED ORAL at 13:05

## 2022-10-26 RX ADMIN — Medication 2 UNITS: at 07:23

## 2022-10-26 RX ADMIN — HYDROMORPHONE HYDROCHLORIDE 4 MG: 4 TABLET ORAL at 08:03

## 2022-10-26 RX ADMIN — SERTRALINE 100 MG: 50 TABLET, FILM COATED ORAL at 13:05

## 2022-10-26 RX ADMIN — ACETAMINOPHEN 1000 MG: 500 TABLET ORAL at 16:57

## 2022-10-26 RX ADMIN — Medication 2 UNITS: at 17:35

## 2022-10-26 RX ADMIN — SPIRONOLACTONE 12.5 MG: 25 TABLET ORAL at 13:05

## 2022-10-26 RX ADMIN — SODIUM CHLORIDE, PRESERVATIVE FREE 10 ML: 5 INJECTION INTRAVENOUS at 14:00

## 2022-10-26 RX ADMIN — SODIUM CHLORIDE, PRESERVATIVE FREE 10 ML: 5 INJECTION INTRAVENOUS at 20:05

## 2022-10-26 RX ADMIN — METOPROLOL TARTRATE 50 MG: 50 TABLET ORAL at 16:57

## 2022-10-26 RX ADMIN — METOPROLOL TARTRATE 50 MG: 50 TABLET ORAL at 13:06

## 2022-10-26 RX ADMIN — HYDROMORPHONE HYDROCHLORIDE 4 MG: 4 TABLET ORAL at 03:39

## 2022-10-26 RX ADMIN — OYSTER SHELL CALCIUM WITH VITAMIN D 1 TABLET: 500; 200 TABLET, FILM COATED ORAL at 16:57

## 2022-10-26 RX ADMIN — APIXABAN 5 MG: 5 TABLET, FILM COATED ORAL at 13:06

## 2022-10-26 RX ADMIN — OYSTER SHELL CALCIUM WITH VITAMIN D 1 TABLET: 500; 200 TABLET, FILM COATED ORAL at 07:24

## 2022-10-26 RX ADMIN — HYDROMORPHONE HYDROCHLORIDE 4 MG: 4 TABLET ORAL at 13:04

## 2022-10-26 RX ADMIN — METOPROLOL TARTRATE 50 MG: 50 TABLET ORAL at 18:00

## 2022-10-26 RX ADMIN — LISINOPRIL 5 MG: 5 TABLET ORAL at 13:05

## 2022-10-26 RX ADMIN — ROSUVASTATIN CALCIUM 20 MG: 10 TABLET, COATED ORAL at 21:36

## 2022-10-26 RX ADMIN — ACETAMINOPHEN 1000 MG: 500 TABLET ORAL at 17:02

## 2022-10-26 RX ADMIN — PANTOPRAZOLE SODIUM 40 MG: 40 TABLET, DELAYED RELEASE ORAL at 07:24

## 2022-10-26 RX ADMIN — ZOLPIDEM TARTRATE 5 MG: 5 TABLET ORAL at 21:36

## 2022-10-26 RX ADMIN — ACETAMINOPHEN 1000 MG: 500 TABLET ORAL at 13:06

## 2022-10-26 RX ADMIN — HYDROMORPHONE HYDROCHLORIDE 4 MG: 4 TABLET ORAL at 16:58

## 2022-10-26 RX ADMIN — HYDROMORPHONE HYDROCHLORIDE 4 MG: 4 TABLET ORAL at 21:36

## 2022-10-26 RX ADMIN — ONDANSETRON HYDROCHLORIDE 4 MG: 2 INJECTION, SOLUTION INTRAMUSCULAR; INTRAVENOUS at 14:14

## 2022-10-26 NOTE — PROGRESS NOTES
Transition Of Care: Update 4:14pm: IPR referrals pending. New referral to [facility]. CM called pending IPR's and left 2nd message again today. The patient is agreeable for CM to sent referral to local [facility]. 10:37am: IPR referrals pending. BLS vs family to transport. CM called and left messages with liaisons at [place] [address]. RUR: 11%    Referrals pending with:     [facility]: Phone: [phone], fax: [phone]. CM spoke with Slick Tolbert and faxed clinicals. [facility]: Sent via [address]. CM called and left message with liaison: [place]: [phone]. CM following for discharge needs.      Krystal Frazier RN/CRM

## 2022-10-26 NOTE — PROGRESS NOTES
10/26/22 0304   Oxygen Therapy   O2 Sat (%) 96 %   Pulse via Oximetry 70 beats per minute   O2 Device Nasal cannula   O2 Flow Rate (L/min) 2 l/min   CPAP/BIPAP   CPAP/BIPAP Start/Stop Off  (Pt refused d/t sore throat)   $$ CPAP Daily Yes  (on S/B in room)   EPAP (cm H2O) 13 cm H2O   Pt's Home Machine No

## 2022-10-26 NOTE — PROGRESS NOTES
Physician Progress Note      PATIENT:               Jacqulyne Kanner  CSN #:                  [id number]  :                       1960  ADMIT DATE:       10/22/2022 2:11 PM  DISCH DATE:  RESPONDING  PROVIDER #:        CASSIE LANG PA-C          QUERY TEXT:    Pt admitted with Fx femur and has diastolic CHF documented. If possible, please document in progress notes and discharge summary further specificity regarding the acuity of CHF:    The medical record reflects the following:    Risk Factors: CHF    Clinical Indicators:  H&P 10/22  Congestive heart failure, diastolic, LVH    ECHO   Left? Ventricle: Normal left ventricular systolic function with a visually estimated EF of 55 - 60%. Left ventricle size is normal. Normal wall thickness. Unable to assess wall motion. Normal diastolic function.  -  Right? Ventricle: Not well visualized. Right ventricle size is normal.    Treatment:  metoprolol tartrate (LOPRESSOR) tablet 50 mg  lisinopriL (PRINIVIL, ZESTRIL) tablet 5 mg  spironolactone (ALDACTONE) tablet 12.5 mg      Thank you,  Virgilio Garcia RN [facility]  Clinical Documentation  [phone] or via [employer]  Options provided:  -- Chronic Diastolic CHF/HFpEF  -- Chronic Systolic and Diastolic CHF  -- Other - I will add my own diagnosis  -- Disagree - Not applicable / Not valid  -- Disagree - Clinically unable to determine / Unknown  -- Refer to Clinical Documentation Reviewer    PROVIDER RESPONSE TEXT:    This patient has chronic diastolic CHF/HFpEF.     Query created by: Vicki Richard on 10/26/2022 9:23 AM      Electronically signed by:  Melva Waller PA-C 10/26/2022 4:14 PM

## 2022-10-26 NOTE — PROGRESS NOTES
Bedside and Verbal shift change report given to Corby Brooks RN (oncoming nurse) by Rupinder Valerio RN (offgoing nurse). Report included the following information SBAR.

## 2022-10-26 NOTE — PROGRESS NOTES
Problem: Falls - Risk of  Goal: *Absence of Falls  Description: Document Lettie Duverney Fall Risk and appropriate interventions in the flowsheet. Outcome: Progressing Towards Goal  Note: Fall Risk Interventions:            Medication Interventions: Bed/chair exit alarm, Evaluate medications/consider consulting pharmacy, Patient to call before getting OOB, Teach patient to arise slowly    Elimination Interventions: Elevated toilet seat, Call light in reach, Patient to call for help with toileting needs, Stay With Me (per policy), Toileting schedule/hourly rounds, Toilet paper/wipes in reach    History of Falls Interventions: Bed/chair exit alarm, Door open when patient unattended, Room close to nurse's station, Utilize gait belt for transfer/ambulation, Assess for delayed presentation/identification of injury for 48 hrs (comment for end date)         Problem: Pressure Injury - Risk of  Goal: *Prevention of pressure injury  Description: Document Wilber Scale and appropriate interventions in the flowsheet. Outcome: Progressing Towards Goal  Note: Pressure Injury Interventions:  Sensory Interventions: Float heels    Moisture Interventions: Absorbent underpads    Activity Interventions: Increase time out of bed, PT/OT evaluation, Pressure redistribution bed/mattress(bed type)    Mobility Interventions: HOB 30 degrees or less, Pressure redistribution bed/mattress (bed type), PT/OT evaluation, Turn and reposition approx.  every two hours(pillow and wedges)    Nutrition Interventions: Document food/fluid/supplement intake, Offer support with meals,snacks and hydration    Friction and Shear Interventions: HOB 30 degrees or less, Lift sheet, Minimize layers, Transferring/repositioning devices

## 2022-10-26 NOTE — PROGRESS NOTES
PHYSICAL THERAPY    Attempted to see patient for PT session, cleared for bed level/EOB activity by RN. Patient reporting increased fatigue and requesting to finish lunch at this time. Lunch tray set up for her. Per RN patient with low hgb at 6.9 with 1 unit PRBCs orders for transfusion. Patient reports that she is \"doing my exercises: the ankle pumps and the squeezes\". Patient requested a leg strap and was provided with same. Will defer and continue to follow.     Hemant Hurtado

## 2022-10-26 NOTE — PROGRESS NOTES
[facility] FRACTURE PROGRESS NOTE    2022  Admit Date:   10/22/2022    Post Op day: 2 Days Post-Op    Subjective:    Sara Seaman states that she is feeling well overall. Indicates that additional blood was taken to recheck her hemoglobin this morning. Hemoglobin is 6.9 at this visit. Also notes that she has been started on some insulin due to persistently elevated blood sugars. States that her pain is relatively well controlled in her right leg with use of Tylenol and Dilaudid. Is awaiting determination for inpatient rehab admission closer to where she lives in [facility]. No new orthopedic complaints.   Tolerating diet  Denies N/V/SOB or CP    PT/OT:   Gait:                    Vital Signs:    Patient Vitals for the past 8 hrs:   BP Temp Pulse Resp SpO2   10/26/22 1000 -- -- 80 -- --   10/26/22 0840 (!) 145/74 97.9 °F (36.6 °C) 87 16 92 %   10/26/22 0600 -- -- 74 -- --   10/26/22 0400 -- -- 76 -- --     Temp (24hrs), Av.4 °F (36.9 °C), Min:97.9 °F (36.6 °C), Max:98.8 °F (37.1 °C)      Pain Control:   Pain Assessment  Pain Scale 1: Numeric (0 - 10)  Pain Intensity 1: 4  Pain Onset 1: post op  Pain Location 1: Leg  Pain Orientation 1: Right  Pain Description 1: Sore, Aching  Pain Intervention(s) 1: Medication (see MAR)    Meds:    Current Facility-Administered Medications   Medication Dose Route Frequency    0.9% sodium chloride infusion 250 mL  250 mL IntraVENous PRN    insulin lispro (HUMALOG) injection   SubCUTAneous AC&HS    dextrose 10 % infusion 0-250 mL  0-250 mL IntraVENous PRN    glucose chewable tablet 16 g  4 Tablet Oral PRN    glucagon (GLUCAGEN) injection 1 mg  1 mg IntraMUSCular PRN    dextrose 10 % infusion 0-250 mL  0-250 mL IntraVENous PRN    spironolactone (ALDACTONE) tablet 12.5 mg  12.5 mg Oral DAILY    pantoprazole (PROTONIX) tablet 40 mg  40 mg Oral ACB    rosuvastatin (CRESTOR) tablet 20 mg  20 mg Oral QHS    sertraline (ZOLOFT) tablet 100 mg  100 mg Oral DAILY    lisinopriL (PRINIVIL, ZESTRIL) tablet 5 mg  5 mg Oral DAILY    sodium chloride (NS) flush 5-40 mL  5-40 mL IntraVENous Q8H    sodium chloride (NS) flush 5-40 mL  5-40 mL IntraVENous PRN    naloxone (NARCAN) injection 0.4 mg  0.4 mg IntraVENous PRN    calcium-vitamin D (OS-HELEN +D3) 500 mg-200 unit per tablet 1 Tablet  1 Tablet Oral TID WITH MEALS    senna-docusate (PERICOLACE) 8.6-50 mg per tablet 1 Tablet  1 Tablet Oral BID    polyethylene glycol (MIRALAX) packet 17 g  17 g Oral DAILY    bisacodyL (DULCOLAX) suppository 10 mg  10 mg Rectal DAILY PRN    apixaban (ELIQUIS) tablet 5 mg  5 mg Oral BID    metoprolol tartrate (LOPRESSOR) tablet 50 mg  50 mg Oral BID    cyclobenzaprine (FLEXERIL) tablet 10 mg  10 mg Oral TID PRN    HYDROmorphone (DILAUDID) tablet 2 mg  2 mg Oral Q4H PRN    zolpidem (AMBIEN) tablet 5 mg  5 mg Oral QHS PRN    sodium chloride (NS) flush 5-40 mL  5-40 mL IntraVENous Q8H    sodium chloride (NS) flush 5-40 mL  5-40 mL IntraVENous PRN    polyethylene glycol (MIRALAX) packet 17 g  17 g Oral DAILY PRN    ondansetron (ZOFRAN ODT) tablet 4 mg  4 mg Oral Q8H PRN    Or    ondansetron (ZOFRAN) injection 4 mg  4 mg IntraVENous Q6H PRN    hydrOXYzine HCL (ATARAX) tablet 10 mg  10 mg Oral TID PRN    acetaminophen (TYLENOL) tablet 1,000 mg  1,000 mg Oral Q6H    HYDROmorphone (DILAUDID) tablet 4 mg  4 mg Oral Q4H PRN       LAB:    Recent Labs     10/26/22  0300   HCT 21.1*   HGB 6.9*       Transfuse PRBC's:      Assessment & Physician's Comment:  Dressing is clean, dry, and intact and anterior wound VAC in place with good seal and minimal output  Neurovascular checks within normal limits  Orientation:  Oriented  Right leg without erythema or ecchymosis. All compartments soft and compressible. Calves soft and nontender bilaterally. Moves the ankles and toes to command.     Active Problems:    Hip fracture ([address] (10/22/2022)        Plan:    Continue PT/OT -toe-touch weightbearing through right leg  Incisional Vac in place with minimal output - to remain in place for a week following discharge then can be removed and replaced with dry sterile dressings; home vac unit should already be present  Continue scheduled Tylenol with Dilaudid as needed  Ice packs to knee as needed  Medical management per primary team  Case management for disposition planning -inpatient rehab location to be determined    Benjamin Atkins PA-C  Orthopedic Trauma Service  [address] [facility]

## 2022-10-26 NOTE — PROGRESS NOTES
[facility] [facility] Adult  Hospitalist Group                                                                                          Hospitalist Progress Note  [place]elia [place], [place]  Answering service: [phone] OR [phone] from in house phone        Date of Service:  10/26/2022  NAME:  Jose Alberto Solorzano  :  1960  MRN:  [id number]      Admission Summary:   Jose Alberto Solorzano is a 63 y.o. female with PMHx of Atrial Fibrillation (On Eliquis), SAMMIE, reported HF (repeat TTE 10/24 with LVEF of 55-60% with normal systolic/diastolic function), depression, and HTN/HLD who presented s/p GLF with imaging revealing acute comminuted fracture of right distal femur above the knee. Now s/p right femur closed reduction retrograde intramedullary nail 10/25/22 with Orthopedic Surgeon, Dr. Sabino Yee. Interval history / Subjective:   Hgb 6.9 on labs this AM. 1U RBCs released but not transfused at the time of exam this morning. Patient was sleeping comfortably and easily roused. She has no complaint at this time. Discussed the plan and answered all questions     Assessment & Plan:      Acute comminuted fracture of distal R femur  -- Appreciate Orthopedic Surgery involvement  -- Now s/p right femur closed reduction retrograde intramedullary nail 10/25/22 with Orthopedic Surgeon, Dr. Sabino Yee. -- Activity: TTWB RLE  -- PT/OT consulted and appreciate their assistance  -- Pain Control: Tylenol 1g q6h, stop standing Tramadol 100mg q6h, Dilaudid 2-4mg q4h PRN, Flexeril 10mg TID PRN for muscle spasms  -- After discharge will need follow-up in 10-14 days with Dr. Sabino Yee     Atrial Fibrillation  -- Anti-arrhythmics: Continue home Metoprolol Tartrate 50mg BID. Hospitalization c/b episode of A-Fib with RVR, now controlled  -- Anti-Coagulation: Home regimen includes Apixaban.  Restarted POD#1 with Orthopedic surgery clearance  -- On Tele    Anemia  -- Likely acute blood loss ISO fracture, procedure  -- Hgb 6.9 this AM (10/26)  -- Will transfuse for hgb > 7     Reported hx of HF  HTN  -- Not on diuretic therapy at home  -- Continue Metoprolol, Lisinopril/Spirinolactone 10/25  -- TTE on 10/24 with LVEF of 55-60% with normal systolic function and normal diastolic function. No significant valvular abnormalities appreciated     Hyperglycemia  -- Post-operatively her glucose level now elevated with advancement of diet. -- Does not carry a diagnosis of diabetes to my understanding. Does not take any medications on a daily basis for diabetes when I reviewed with her 10/24  -- Start lispro sliding scale with POCT glucose AC HS  -- Hgb A1c: 6.3 (10/26/22)     Depression  -- Continue home Zoloft 100mg daily     GERD  -- Continue home Pantoprazole daily     HLD  -- Continue home Rosuvastatin nightly      SAMMIE  Morbid obesity  -- Encourage CPAP  -- Encourage weight loss     Insomnia  -- Ambien nightly PRN     Code status: Full Code  Prophylaxis: Apixaban  Care Plan discussed with: Pt  Anticipated Disposition: TBD, likely [address] Problems  Never Reviewed            Codes Class Noted POA    Hip fracture [facility]) ICD-10-CM: S72.009A  ICD-9-CM: 820.8  10/22/2022 Unknown             Review of Systems:   A comprehensive review of systems was negative except for that written in the HPI. Vital Signs:    Last 24hrs VS reviewed since prior progress note.  Most recent are:  Visit Vitals  BP (!) 145/74 (BP 1 Location: Right upper arm, BP Patient Position: Semi fowlers)   Pulse 80   Temp 97.9 °F (36.6 °C)   Resp 16   Ht 5' 4\" (1.626 m)   Wt 151 kg (332 lb 14.3 oz)   SpO2 92%   BMI 57.14 kg/m²         Intake/Output Summary (Last 24 hours) at 10/26/2022 1228  Last data filed at 10/26/2022 6851  Gross per 24 hour   Intake --   Output 2650 ml   Net -2650 ml        Physical Examination:     I had a face to face encounter with this patient and independently examined them on 10/26/2022 as outlined below:          Constitutional:  No acute distress, cooperative, pleasant    ENT:  Oral mucosa moist, oropharynx benign. Resp:  CTA bilaterally. No wheezing/rhonchi/rales. No accessory muscle use. CV:  Regular rhythm, normal rate, no murmurs, gallops, rubs    GI:    :  Soft, non distended, non tender. normoactive bowel sounds, no hepatosplenomegaly   Parry catheter in place, light yellow urine in bag    Musculoskeletal:  RLE wound vac in place, dressing C/D/I, 2+ radial and DP pulses,     Neurologic:  Moves all extremities. AAOx3, no focal deficit            Data Review:    Review and/or order of clinical lab test  Review and/or order of tests in the radiology section of CPT  Review and/or order of tests in the medicine section of CPT      Labs:     Recent Labs     10/26/22  0300 10/25/22  0348   WBC 9.6 12.0*   HGB 6.9* 8.4*   HCT 21.1* 25.1*    156     Recent Labs     10/26/22  0300 10/25/22  0348 10/24/22  0347   * 132* 135*   K 3.6 4.1 3.8    98 101   CO2 25 25 28   BUN 14 12 11   CREA 0.91 0.90 0.96   * 257* 172*   CA 8.6 8.6 8.7   MG 1.8 1.5* 1.7   PHOS 1.9* 2.2* 3.2     No results for input(s): ALT, AP, TBIL, TBILI, TP, ALB, GLOB, GGT, AML, LPSE in the last 72 hours. No lab exists for component: SGOT, GPT, AMYP, HLPSE  No results for input(s): INR, PTP, APTT, INREXT in the last 72 hours. No results for input(s): FE, TIBC, PSAT, FERR in the last 72 hours. No results found for: FOL, RBCF   No results for input(s): PH, PCO2, PO2 in the last 72 hours. No results for input(s): CPK, CKNDX, TROIQ in the last 72 hours.     No lab exists for component: CPKMB  No results found for: CHOL, CHOLX, CHLST, CHOLV, HDL, HDLP, LDL, LDLC, DLDLP, TGLX, TRIGL, TRIGP, CHHD, CHHDX  Lab Results   Component Value Date/Time    Glucose (POC) 165 (H) 10/26/2022 11:10 AM    Glucose (POC) 156 (H) 10/26/2022 06:27 AM    Glucose (POC) 227 (H) 10/25/2022 09:22 PM    Glucose (POC) 208 (H) 10/25/2022 04:40 PM    Glucose (POC) 285 (H) 10/25/2022 12:27 PM     No results found for: COLOR, APPRN, SPGRU, REFSG, SHANELLE, PROTU, GLUCU, KETU, BILU, UROU, WOLF, LEUKU, GLUKE, EPSU, BACTU, WBCU, RBCU, CASTS, UCRY      Medications Reviewed:     Current Facility-Administered Medications   Medication Dose Route Frequency    0.9% sodium chloride infusion 250 mL  250 mL IntraVENous PRN    insulin lispro (HUMALOG) injection   SubCUTAneous AC&HS    dextrose 10 % infusion 0-250 mL  0-250 mL IntraVENous PRN    glucose chewable tablet 16 g  4 Tablet Oral PRN    glucagon (GLUCAGEN) injection 1 mg  1 mg IntraMUSCular PRN    dextrose 10 % infusion 0-250 mL  0-250 mL IntraVENous PRN    spironolactone (ALDACTONE) tablet 12.5 mg  12.5 mg Oral DAILY    pantoprazole (PROTONIX) tablet 40 mg  40 mg Oral ACB    rosuvastatin (CRESTOR) tablet 20 mg  20 mg Oral QHS    sertraline (ZOLOFT) tablet 100 mg  100 mg Oral DAILY    lisinopriL (PRINIVIL, ZESTRIL) tablet 5 mg  5 mg Oral DAILY    sodium chloride (NS) flush 5-40 mL  5-40 mL IntraVENous Q8H    sodium chloride (NS) flush 5-40 mL  5-40 mL IntraVENous PRN    naloxone (NARCAN) injection 0.4 mg  0.4 mg IntraVENous PRN    calcium-vitamin D (OS-HELEN +D3) 500 mg-200 unit per tablet 1 Tablet  1 Tablet Oral TID WITH MEALS    senna-docusate (PERICOLACE) 8.6-50 mg per tablet 1 Tablet  1 Tablet Oral BID    polyethylene glycol (MIRALAX) packet 17 g  17 g Oral DAILY    bisacodyL (DULCOLAX) suppository 10 mg  10 mg Rectal DAILY PRN    apixaban (ELIQUIS) tablet 5 mg  5 mg Oral BID    metoprolol tartrate (LOPRESSOR) tablet 50 mg  50 mg Oral BID    cyclobenzaprine (FLEXERIL) tablet 10 mg  10 mg Oral TID PRN    HYDROmorphone (DILAUDID) tablet 2 mg  2 mg Oral Q4H PRN    zolpidem (AMBIEN) tablet 5 mg  5 mg Oral QHS PRN    sodium chloride (NS) flush 5-40 mL  5-40 mL IntraVENous Q8H    sodium chloride (NS) flush 5-40 mL  5-40 mL IntraVENous PRN    polyethylene glycol (MIRALAX) packet 17 g  17 g Oral DAILY PRN    ondansetron (ZOFRAN ODT) tablet 4 mg  4 mg Oral Q8H PRN    Or ondansetron (ZOFRAN) injection 4 mg  4 mg IntraVENous Q6H PRN    hydrOXYzine HCL (ATARAX) tablet 10 mg  10 mg Oral TID PRN    acetaminophen (TYLENOL) tablet 1,000 mg  1,000 mg Oral Q6H    HYDROmorphone (DILAUDID) tablet 4 mg  4 mg Oral Q4H PRN     ______________________________________________________________________  EXPECTED LENGTH OF STAY: 2d 19h  ACTUAL LENGTH OF STAY:          4                 David M Milligram, PA-C

## 2022-10-26 NOTE — PROGRESS NOTES
Occupational Therapy    Attempted to see patient for OT session, cleared for bed level/EOB activity by RN. Patient reporting increased fatigue and requesting to finish lunch at this time. Per RN patient with low hgb at 6.9 with 1 unit PRBCs orders for transfusion. Will defer and continue to follow.      Candice Griffith, BALAJI, OTR/L

## 2022-10-27 LAB
ABO + RH BLD: NORMAL
ANION GAP SERPL CALC-SCNC: 4 MMOL/L (ref 5–15)
BASOPHILS # BLD: 0 K/UL (ref 0–0.1)
BASOPHILS NFR BLD: 1 % (ref 0–1)
BLD PROD TYP BPU: NORMAL
BLOOD GROUP ANTIBODIES SERPL: NORMAL
BPU ID: NORMAL
BUN SERPL-MCNC: 15 MG/DL (ref 6–20)
BUN/CREAT SERPL: 19 (ref 12–20)
CALCIUM SERPL-MCNC: 9.2 MG/DL (ref 8.5–10.1)
CHLORIDE SERPL-SCNC: 102 MMOL/L (ref 97–108)
CO2 SERPL-SCNC: 28 MMOL/L (ref 21–32)
CREAT SERPL-MCNC: 0.79 MG/DL (ref 0.55–1.02)
CROSSMATCH RESULT,%XM: NORMAL
DIFFERENTIAL METHOD BLD: ABNORMAL
EOSINOPHIL # BLD: 0.3 K/UL (ref 0–0.4)
EOSINOPHIL NFR BLD: 3 % (ref 0–7)
ERYTHROCYTE [DISTWIDTH] IN BLOOD BY AUTOMATED COUNT: 14.6 % (ref 11.5–14.5)
GLUCOSE BLD STRIP.AUTO-MCNC: 140 MG/DL (ref 65–117)
GLUCOSE BLD STRIP.AUTO-MCNC: 171 MG/DL (ref 65–117)
GLUCOSE BLD STRIP.AUTO-MCNC: 181 MG/DL (ref 65–117)
GLUCOSE BLD STRIP.AUTO-MCNC: 190 MG/DL (ref 65–117)
GLUCOSE SERPL-MCNC: 152 MG/DL (ref 65–100)
HCT VFR BLD AUTO: 23.7 % (ref 35–47)
HGB BLD-MCNC: 7.7 G/DL (ref 11.5–16)
IMM GRANULOCYTES # BLD AUTO: 0.2 K/UL (ref 0–0.04)
IMM GRANULOCYTES NFR BLD AUTO: 2 % (ref 0–0.5)
LYMPHOCYTES # BLD: 1.2 K/UL (ref 0.8–3.5)
LYMPHOCYTES NFR BLD: 15 % (ref 12–49)
MAGNESIUM SERPL-MCNC: 1.7 MG/DL (ref 1.6–2.4)
MCH RBC QN AUTO: 32.9 PG (ref 26–34)
MCHC RBC AUTO-ENTMCNC: 32.5 G/DL (ref 30–36.5)
MCV RBC AUTO: 101.3 FL (ref 80–99)
MONOCYTES # BLD: 0.6 K/UL (ref 0–1)
MONOCYTES NFR BLD: 7 % (ref 5–13)
NEUTS SEG # BLD: 5.9 K/UL (ref 1.8–8)
NEUTS SEG NFR BLD: 72 % (ref 32–75)
NRBC # BLD: 0.03 K/UL (ref 0–0.01)
NRBC BLD-RTO: 0.4 PER 100 WBC
PHOSPHATE SERPL-MCNC: 2 MG/DL (ref 2.6–4.7)
PLATELET # BLD AUTO: 191 K/UL (ref 150–400)
PMV BLD AUTO: 10.4 FL (ref 8.9–12.9)
POTASSIUM SERPL-SCNC: 3.7 MMOL/L (ref 3.5–5.1)
RBC # BLD AUTO: 2.34 M/UL (ref 3.8–5.2)
SERVICE CMNT-IMP: ABNORMAL
SODIUM SERPL-SCNC: 134 MMOL/L (ref 136–145)
SPECIMEN EXP DATE BLD: NORMAL
STATUS OF UNIT,%ST: NORMAL
UNIT DIVISION, %UDIV: 0
WBC # BLD AUTO: 8.2 K/UL (ref 3.6–11)

## 2022-10-27 PROCEDURE — 83735 ASSAY OF MAGNESIUM: CPT

## 2022-10-27 PROCEDURE — 74011250637 HC RX REV CODE- 250/637: Performed by: ORTHOPAEDIC SURGERY

## 2022-10-27 PROCEDURE — 36415 COLL VENOUS BLD VENIPUNCTURE: CPT

## 2022-10-27 PROCEDURE — 80048 BASIC METABOLIC PNL TOTAL CA: CPT

## 2022-10-27 PROCEDURE — 74011000250 HC RX REV CODE- 250: Performed by: INTERNAL MEDICINE

## 2022-10-27 PROCEDURE — 74011250637 HC RX REV CODE- 250/637: Performed by: NURSE PRACTITIONER

## 2022-10-27 PROCEDURE — 65270000046 HC RM TELEMETRY

## 2022-10-27 PROCEDURE — 74011250637 HC RX REV CODE- 250/637: Performed by: PHYSICIAN ASSISTANT

## 2022-10-27 PROCEDURE — 74011636637 HC RX REV CODE- 636/637: Performed by: PHYSICIAN ASSISTANT

## 2022-10-27 PROCEDURE — 82962 GLUCOSE BLOOD TEST: CPT

## 2022-10-27 PROCEDURE — 97535 SELF CARE MNGMENT TRAINING: CPT

## 2022-10-27 PROCEDURE — 74011250637 HC RX REV CODE- 250/637: Performed by: HOSPITALIST

## 2022-10-27 PROCEDURE — 97530 THERAPEUTIC ACTIVITIES: CPT

## 2022-10-27 PROCEDURE — 85025 COMPLETE CBC W/AUTO DIFF WBC: CPT

## 2022-10-27 PROCEDURE — 84100 ASSAY OF PHOSPHORUS: CPT

## 2022-10-27 PROCEDURE — 74011000250 HC RX REV CODE- 250: Performed by: ORTHOPAEDIC SURGERY

## 2022-10-27 RX ADMIN — Medication 2 UNITS: at 07:04

## 2022-10-27 RX ADMIN — APIXABAN 5 MG: 5 TABLET, FILM COATED ORAL at 18:25

## 2022-10-27 RX ADMIN — ACETAMINOPHEN 1000 MG: 500 TABLET ORAL at 18:25

## 2022-10-27 RX ADMIN — METOPROLOL TARTRATE 50 MG: 50 TABLET ORAL at 08:47

## 2022-10-27 RX ADMIN — ACETAMINOPHEN 1000 MG: 500 TABLET ORAL at 00:20

## 2022-10-27 RX ADMIN — Medication 2 UNITS: at 18:25

## 2022-10-27 RX ADMIN — HYDROMORPHONE HYDROCHLORIDE 4 MG: 4 TABLET ORAL at 02:21

## 2022-10-27 RX ADMIN — ROSUVASTATIN CALCIUM 20 MG: 10 TABLET, COATED ORAL at 23:02

## 2022-10-27 RX ADMIN — SERTRALINE 100 MG: 50 TABLET, FILM COATED ORAL at 08:47

## 2022-10-27 RX ADMIN — METOPROLOL TARTRATE 50 MG: 50 TABLET ORAL at 18:36

## 2022-10-27 RX ADMIN — SENNOSIDES AND DOCUSATE SODIUM 1 TABLET: 50; 8.6 TABLET ORAL at 08:47

## 2022-10-27 RX ADMIN — CYCLOBENZAPRINE 10 MG: 10 TABLET, FILM COATED ORAL at 23:02

## 2022-10-27 RX ADMIN — ACETAMINOPHEN 1000 MG: 500 TABLET ORAL at 13:58

## 2022-10-27 RX ADMIN — HYDROMORPHONE HYDROCHLORIDE 4 MG: 4 TABLET ORAL at 06:12

## 2022-10-27 RX ADMIN — OYSTER SHELL CALCIUM WITH VITAMIN D 1 TABLET: 500; 200 TABLET, FILM COATED ORAL at 18:25

## 2022-10-27 RX ADMIN — SODIUM CHLORIDE, PRESERVATIVE FREE 10 ML: 5 INJECTION INTRAVENOUS at 14:00

## 2022-10-27 RX ADMIN — HYDROMORPHONE HYDROCHLORIDE 4 MG: 4 TABLET ORAL at 19:07

## 2022-10-27 RX ADMIN — Medication 2 UNITS: at 12:01

## 2022-10-27 RX ADMIN — HYDROMORPHONE HYDROCHLORIDE 4 MG: 4 TABLET ORAL at 10:04

## 2022-10-27 RX ADMIN — APIXABAN 5 MG: 5 TABLET, FILM COATED ORAL at 08:47

## 2022-10-27 RX ADMIN — PANTOPRAZOLE SODIUM 40 MG: 40 TABLET, DELAYED RELEASE ORAL at 07:04

## 2022-10-27 RX ADMIN — POLYETHYLENE GLYCOL 3350 17 G: 17 POWDER, FOR SOLUTION ORAL at 08:50

## 2022-10-27 RX ADMIN — SPIRONOLACTONE 12.5 MG: 25 TABLET ORAL at 08:47

## 2022-10-27 RX ADMIN — CYCLOBENZAPRINE 10 MG: 10 TABLET, FILM COATED ORAL at 10:10

## 2022-10-27 RX ADMIN — OYSTER SHELL CALCIUM WITH VITAMIN D 1 TABLET: 500; 200 TABLET, FILM COATED ORAL at 12:03

## 2022-10-27 RX ADMIN — LISINOPRIL 5 MG: 5 TABLET ORAL at 08:47

## 2022-10-27 RX ADMIN — HYDROMORPHONE HYDROCHLORIDE 4 MG: 4 TABLET ORAL at 13:58

## 2022-10-27 RX ADMIN — OYSTER SHELL CALCIUM WITH VITAMIN D 1 TABLET: 500; 200 TABLET, FILM COATED ORAL at 07:04

## 2022-10-27 NOTE — PROGRESS NOTES
[facility] [facility] Adult  Hospitalist Group                                                                                          Hospitalist Progress Note  Anjali [place]  Answering service: [phone] from in house phone        Date of Service:  10/27/2022  NAME:  Holland Jules  :  1960  MRN:  [id number]      Admission Summary:   Holland Jules is a 63 y.o. female with PMHx of Atrial Fibrillation (On Eliquis), SAMMIE, reported HF (repeat TTE 10/24 with LVEF of 55-60% with normal systolic/diastolic function), depression, and HTN/HLD who presented s/p GLF with imaging revealing acute comminuted fracture of right distal femur above the knee. Now s/p right femur closed reduction retrograde intramedullary nail 10/25/22 with Orthopedic Surgeon, Dr. Cara Castillo. Interval history / Subjective:   Hgb improved to 8.2 following 1U PRBCs yesterday, Hgb was 7.7 with am labs    Patient reports no complaints at this time. She notes that her pain is well controlled until approximately one hour before her next dose is due. Discussed plan with the patient including pain control regimen     Assessment & Plan:      Acute comminuted fracture of distal R femur  -- Appreciate Orthopedic Surgery involvement  -- Now s/p right femur closed reduction retrograde intramedullary nail 10/25/22 with Orthopedic Surgeon, Dr. Cara Castillo. -- Activity: TTWB RLE  -- PT/OT consulted and appreciate their assistance  -- Pain Control: Tylenol 1g q6h, Dilaudid 2-4mg q4h PRN, Flexeril 10mg TID PRN for muscle spasms  -- After discharge will need follow-up in 10-14 days with Dr. Cara Castillo     Atrial Fibrillation  -- Anti-arrhythmics: Continue home Metoprolol Tartrate 50mg BID. Hospitalization c/b episode of A-Fib with RVR, now controlled  -- Anti-Coagulation: Home regimen includes Apixaban.  Restarted POD#1 with Orthopedic surgery clearance  -- On Tele    Anemia  -- Likely acute blood loss ISO fracture, procedure  -- Hgb 6.9 this AM (10/26), Improved to 8.2 following 1U PRBCs,  -- Repeat H&H at 1400 (10/27)  -- Will transfuse for hgb > 7     Reported hx of HF  HTN  -- Not on diuretic therapy at home  -- Continue Metoprolol, Lisinopril/Spirinolactone 10/25  -- TTE on 10/24 with LVEF of 55-60% with normal systolic function and normal diastolic function. No significant valvular abnormalities appreciated     Hyperglycemia  -- Post-operatively her glucose level now elevated with advancement of diet. -- Does not carry a diagnosis of diabetes. I reviewed her PTA meds and there were no diabetes medications listed  -- Start lispro sliding scale with POCT glucose AC HS  -- Hgb A1c: 6.3 (10/26/22)     Depression  -- Continue home Zoloft 100mg daily     GERD  -- Continue home Pantoprazole daily     HLD  -- Continue home Rosuvastatin nightly      SAMMIE  Morbid obesity  -- Encourage CPAP  -- Encourage weight loss     Insomnia  -- Ambien nightly PRN     Code status: Full Code  Prophylaxis: Apixaban  Care Plan discussed with: Pt  Anticipated Disposition: TBD     Hospital Problems  Never Reviewed            Codes Class Noted POA    Hip fracture [facility]) ICD-10-CM: R98.145C  ICD-9-CM: 820.8  10/22/2022 Unknown           Review of Systems:   A comprehensive review of systems was negative except for that written in the HPI. Vital Signs:    Last 24hrs VS reviewed since prior progress note.  Most recent are:  Visit Vitals  /77   Pulse 84   Temp 98.9 °F (37.2 °C)   Resp 16   Ht 5' 4\" (1.626 m)   Wt 150 kg (330 lb 11 oz)   SpO2 94%   BMI 56.76 kg/m²         Intake/Output Summary (Last 24 hours) at 10/27/2022 1107  Last data filed at 10/27/2022 0845  Gross per 24 hour   Intake 658.83 ml   Output 2225 ml   Net -1566.17 ml          Physical Examination:     I had a face to face encounter with this patient and independently examined them on 10/27/2022 as outlined below:          Constitutional:  No acute distress, cooperative, pleasant    ENT:  Oral mucosa moist, oropharynx benign. Resp:  CTA bilaterally. No wheezing/rhonchi/rales. No accessory muscle use. CV:  Regular rhythm, normal rate, no murmurs, gallops, rubs    GI:    :  Soft, non distended, non tender. normoactive bowel sounds, no hepatosplenomegaly   Parry catheter in place, light yellow urine in bag    Musculoskeletal:  RLE wound vac in place, dressing C/D/I, 2+ radial and DP pulses,     Neurologic:  Moves all extremities. AAOx3, no focal deficit            Data Review:    Review and/or order of clinical lab test  Review and/or order of tests in the radiology section of [facility]  Review and/or order of tests in the medicine section of [facility]      Labs:     Recent Labs     10/27/22  0231 10/26/22  2025 10/26/22  0300   WBC 8.2  --  9.6   HGB 7.7* 8.2* 6.9*   HCT 23.7* 24.6* 21.1*     --  169       Recent Labs     10/27/22  0231 10/26/22  0300 10/25/22  0348   * 134* 132*   K 3.7 3.6 4.1    100 98   CO2 28 25 25   BUN 15 14 12   CREA 0.79 0.91 0.90   * 169* 257*   CA 9.2 8.6 8.6   MG 1.7 1.8 1.5*   PHOS 2.0* 1.9* 2.2*       No results for input(s): ALT, AP, TBIL, TBILI, TP, ALB, GLOB, GGT, AML, LPSE in the last 72 hours. No lab exists for component: SGOT, GPT, AMYP, HLPSE  No results for input(s): INR, PTP, APTT, INREXT, INREXT in the last 72 hours. No results for input(s): FE, TIBC, PSAT, FERR in the last 72 hours. No results found for: FOL, RBCF   No results for input(s): PH, PCO2, PO2 in the last 72 hours. No results for input(s): CPK, CKNDX, TROIQ in the last 72 hours.     No lab exists for component: CPKMB  No results found for: CHOL, CHOLX, CHLST, CHOLV, HDL, HDLP, LDL, LDLC, DLDLP, TGLX, TRIGL, TRIGP, CHHD, [facility]  Lab Results   Component Value Date/Time    Glucose (POC) 140 (H) 10/27/2022 06:14 AM    Glucose (POC) 160 (H) 10/26/2022 09:33 PM    Glucose (POC) 169 (H) 10/26/2022 05:04 PM    Glucose (POC) 165 (H) 10/26/2022 11:10 AM    Glucose (POC) 156 (H) 10/26/2022 06:27 AM     No results found for: COLOR, APPRN, SPGRU, REFSG, SHANELLE, PROTU, GLUCU, KETU, BILU, UROU, WOLF, LEUKU, GLUKE, EPSU, BACTU, WBCU, RBCU, CASTS, UCRY      Medications Reviewed:     Current Facility-Administered Medications   Medication Dose Route Frequency    0.9% sodium chloride infusion 250 mL  250 mL IntraVENous PRN    insulin lispro (HUMALOG) injection   SubCUTAneous AC&HS    dextrose 10 % infusion 0-250 mL  0-250 mL IntraVENous PRN    glucose chewable tablet 16 g  4 Tablet Oral PRN    glucagon (GLUCAGEN) injection 1 mg  1 mg IntraMUSCular PRN    dextrose 10 % infusion 0-250 mL  0-250 mL IntraVENous PRN    spironolactone (ALDACTONE) tablet 12.5 mg  12.5 mg Oral DAILY    pantoprazole (PROTONIX) tablet 40 mg  40 mg Oral ACB    rosuvastatin (CRESTOR) tablet 20 mg  20 mg Oral QHS    sertraline (ZOLOFT) tablet 100 mg  100 mg Oral DAILY    lisinopriL (PRINIVIL, ZESTRIL) tablet 5 mg  5 mg Oral DAILY    sodium chloride (NS) flush 5-40 mL  5-40 mL IntraVENous Q8H    sodium chloride (NS) flush 5-40 mL  5-40 mL IntraVENous PRN    naloxone (NARCAN) injection 0.4 mg  0.4 mg IntraVENous PRN    calcium-vitamin D (OS-HELEN +D3) 500 mg-200 unit per tablet 1 Tablet  1 Tablet Oral TID WITH MEALS    senna-docusate (PERICOLACE) 8.6-50 mg per tablet 1 Tablet  1 Tablet Oral BID    polyethylene glycol (MIRALAX) packet 17 g  17 g Oral DAILY    bisacodyL (DULCOLAX) suppository 10 mg  10 mg Rectal DAILY PRN    apixaban (ELIQUIS) tablet 5 mg  5 mg Oral BID    metoprolol tartrate (LOPRESSOR) tablet 50 mg  50 mg Oral BID    cyclobenzaprine (FLEXERIL) tablet 10 mg  10 mg Oral TID PRN    HYDROmorphone (DILAUDID) tablet 2 mg  2 mg Oral Q4H PRN    zolpidem (AMBIEN) tablet 5 mg  5 mg Oral QHS PRN    sodium chloride (NS) flush 5-40 mL  5-40 mL IntraVENous Q8H    sodium chloride (NS) flush 5-40 mL  5-40 mL IntraVENous PRN    polyethylene glycol (MIRALAX) packet 17 g  17 g Oral DAILY PRN    ondansetron (ZOFRAN ODT) tablet 4 mg  4 mg Oral Q8H PRN    Or    ondansetron (ZOFRAN) injection 4 mg  4 mg IntraVENous Q6H PRN    hydrOXYzine HCL (ATARAX) tablet 10 mg  10 mg Oral TID PRN    acetaminophen (TYLENOL) tablet 1,000 mg  1,000 mg Oral Q6H    HYDROmorphone (DILAUDID) tablet 4 mg  4 mg Oral Q4H PRN     ______________________________________________________________________  EXPECTED LENGTH OF STAY: 2d 19h  ACTUAL LENGTH OF STAY:          5                 David M Milligram, PA-C

## 2022-10-27 NOTE — PROGRESS NOTES
Progress Note    Status Post: right Femur Intramedullary nail  Date of Surgery: 10/24/22  Surgeon: Dr. Haroldo Miramontes    Subjective:     No acute events over night. Dylan Fair states that she is doing ok. She says that she is surprised to still have pain in the right thigh. She said it is worse in the knee than the thigh. She says she is feels better after the transfusion yesterday. Denies CP, SOB, nausea/vomitting, parasthesias. Objective:   Visit Vitals  /77   Pulse 84   Temp 98.9 °F (37.2 °C)   Resp 16   Ht 5' 4\" (1.626 m)   Wt 150 kg (330 lb 11 oz)   SpO2 94%   BMI 56.76 kg/m²       Patient Vitals for the past 24 hrs:   Temp Pulse Resp BP SpO2   10/27/22 1000 -- 84 -- -- --   10/27/22 0845 98.9 °F (37.2 °C) 89 16 128/77 94 %   10/27/22 0600 -- 84 -- -- --   10/27/22 0400 -- 71 -- -- --   10/27/22 0312 98.2 °F (36.8 °C) 60 16 (!) 101/56 98 %   10/27/22 0200 -- 69 -- -- --   10/26/22 2200 -- 68 -- -- --   10/26/22 2007 98.4 °F (36.9 °C) 73 16 109/66 95 %   10/26/22 2000 -- 67 -- -- --   10/26/22 1800 -- 80 -- -- --   10/26/22 1423 98.9 °F (37.2 °C) 89 16 130/73 98 %   10/26/22 1400 -- 81 -- -- --   10/26/22 1358 98.9 °F (37.2 °C) 86 16 119/66 98 %   10/26/22 1333 98.1 °F (36.7 °C) 87 16 109/68 98 %   10/26/22 1200 -- 90 -- -- --        Physical Exam:  Gen: NAD, AAOx3  Resp: No acute respiratory distress  MSK:  RLE:  Dressings clean, dry, and intact  Wound vacuum intact with no drainage in the canister.   Motor intact distally  Sensation is intact to light touch distally    Ice was replaced over the knee    Intake/Output Summary (Last 24 hours) at 10/27/2022 1059  Last data filed at 10/27/2022 0845  Gross per 24 hour   Intake 658.83 ml   Output 2225 ml   Net -1566.17 ml       LABS :  Recent Results (from the past 24 hour(s))   GLUCOSE, POC    Collection Time: 10/26/22 11:10 AM   Result Value Ref Range    Glucose (POC) 165 (H) 65 - 117 mg/dL    Performed by [address] [place] [address] Collection Time: 10/26/22  5:04 PM   Result Value Ref Range    Glucose (POC) 169 (H) 65 - 117 mg/dL    Performed by Jonathan Mars    HGB & HCT    Collection Time: 10/26/22  8:25 PM   Result Value Ref Range    HGB 8.2 (L) 11.5 - 16.0 g/dL    HCT 24.6 (L) 35.0 - 47.0 %   GLUCOSE, POC    Collection Time: 10/26/22  9:33 PM   Result Value Ref Range    Glucose (POC) 160 (H) 65 - 117 mg/dL    Performed by [address] [place] [address]    Collection Time: 10/27/22  2:31 AM   Result Value Ref Range    Magnesium 1.7 1.6 - 2.4 mg/dL   PHOSPHORUS    Collection Time: 10/27/22  2:31 AM   Result Value Ref Range    Phosphorus 2.0 (L) 2.6 - 4.7 MG/DL   CBC WITH AUTOMATED DIFF    Collection Time: 10/27/22  2:31 AM   Result Value Ref Range    WBC 8.2 3.6 - 11.0 K/uL    RBC 2.34 (L) 3.80 - 5.20 M/uL    HGB 7.7 (L) 11.5 - 16.0 g/dL    HCT 23.7 (L) 35.0 - 47.0 %    .3 (H) 80.0 - 99.0 FL    MCH 32.9 26.0 - 34.0 PG    MCHC 32.5 30.0 - 36.5 g/dL    RDW 14.6 (H) 11.5 - 14.5 %    PLATELET 088 346 - 442 K/uL    MPV 10.4 8.9 - 12.9 FL    NRBC 0.4 (H) 0  WBC    ABSOLUTE NRBC 0.03 (H) 0.00 - 0.01 K/uL    NEUTROPHILS 72 32 - 75 %    LYMPHOCYTES 15 12 - 49 %    MONOCYTES 7 5 - 13 %    EOSINOPHILS 3 0 - 7 %    BASOPHILS 1 0 - 1 %    IMMATURE GRANULOCYTES 2 (H) 0.0 - 0.5 %    ABS. NEUTROPHILS 5.9 1.8 - 8.0 K/UL    ABS. LYMPHOCYTES 1.2 0.8 - 3.5 K/UL    ABS. MONOCYTES 0.6 0.0 - 1.0 K/UL    ABS. EOSINOPHILS 0.3 0.0 - 0.4 K/UL    ABS. BASOPHILS 0.0 0.0 - 0.1 K/UL    ABS. IMM.  GRANS. 0.2 (H) 0.00 - 0.04 K/UL    DF AUTOMATED     METABOLIC PANEL, BASIC    Collection Time: 10/27/22  2:31 AM   Result Value Ref Range    Sodium 134 (L) 136 - 145 mmol/L    Potassium 3.7 3.5 - 5.1 mmol/L    Chloride 102 97 - 108 mmol/L    CO2 28 21 - 32 mmol/L    Anion gap 4 (L) 5 - 15 mmol/L    Glucose 152 (H) 65 - 100 mg/dL    BUN 15 6 - 20 MG/DL    Creatinine 0.79 0.55 - 1.02 MG/DL    BUN/Creatinine ratio 19 12 - 20      eGFR >60 >60 ml/min/1.73m2    Calcium 9.2 8.5 - 10.1 MG/DL   GLUCOSE, POC    Collection Time: 10/27/22  6:14 AM   Result Value Ref Range    Glucose (POC) 140 (H) 65 - 117 mg/dL    Performed by Adelina Holstein         Assessment:   Lucienne Mcburney is a 63y.o. year-old female with right Femur Fracture status post Intramedullary nail by myself POD # 3    Plan:   -Suhas Conteh: TTWB RLE  -DVT prophylaxis: SCDs, frequent ambulation, Eliquis twice daily resuming today  -Antibiotics: completed  -Pain control: Continue as needed pain management, ice to operative area, elevate  -PT/OT for mobilization  -Dispo: Discharge planning to Home when ready  -Continue Prevena wound VAC. At time of discharge, please transition hospital unit to home unit.  -Ice, elevate  Follow up with me in office in 10-14 days        Niki Escamilla MD    10/27/22  10:59 AM        Niki Escamilla M.D.   [address] [place] [address] Office  Cell: [phone]  Office: [phone]  Medical Staff: Emil Paulino MA

## 2022-10-27 NOTE — PROGRESS NOTES
Bedside and Verbal shift change report given to Candace Hidalgo RN (oncoming nurse) by Ching Menchaca RN (offgoing nurse). Report included the following information SBAR and MAR.

## 2022-10-27 NOTE — PROGRESS NOTES
Problem: Mobility Impaired (Adult and Pediatric)  Goal: *Acute Goals and Plan of Care (Insert Text)  Description: FUNCTIONAL STATUS PRIOR TO ADMISSION: Patient was modified independent using a single point cane for functional mobility. HOME SUPPORT PRIOR TO ADMISSION: The patient lived with roommate but did not require assist. Patient lives on first floor of a two story home with 3 steps and bilateral rails to enter. Room mate lives with her but is disabled: can not help her physically, but can assist with ADLs (errands, groceries, etc). Physical Therapy Goals  Initiated 10/25/2022    1. Patient will move from supine to sit and sit to supine , scoot up and down, and roll side to side in bed with minimal assistance within 7 days. 2. Patient will perform sit to stand and chair transfer with RW and minimal assistance within 7 days. 3. Patient will perform post-op hip exercise home exercise program per protocol with independence within 7 days. Outcome: Progressing Towards Goal   PHYSICAL THERAPY TREATMENT  Patient: Leticia Bentley (21 y.o. female)  Date: 10/27/2022  Diagnosis: Hip fracture ([address] Coy Suarez <principal problem not specified>  Procedure(s) (LRB):  RIGHT FEMUR RETROGRADE NAIL. FLAT SHARMAINE, TRIANGLES, C-ARM, PREVENA WOUND VAC. Marv Garcia. NEED SA.REQUEST TO FOLLOW (Right) 3 Days Post-Op  Precautions: Fall, TTWB (right LE/needs bariatric equipment)  Chart, physical therapy assessment, plan of care and goals were reviewed. ASSESSMENT  Patient continues with skilled PT services and is progressing towards goals. Patient performed RLE exercises with tactile and verbal cues. Performed bed mobility to include attempts to scoot bottom to head of bed in sitting, using UEs and left leg. Performed sit-stand, first at [facility] [place] [place] (unable to get all of the way upright), OT supporting right. foot to maintain/assess TTWB'ing. Attempted sit-stand using rail on back of chair, but she preferred to try RW again. Sit-stand a second time and nurse assessed skin on low back and buttocks. Only tolerated standing for 20 seconds. Current Level of Function Impacting Discharge (mobility/balance): Moderate assistance, additional time and use of strap for supine-sit. Maximal assistance of 2 for sit-supine and scooting. Moderate assistance of 2 and use of RW forsit-stand-sit. Other factors to consider for discharge: No outside assistance/TTWB'ing status/Far from Modified Independent PLOF         PLAN :  Patient continues to benefit from skilled intervention to address the above impairments. Continue treatment per established plan of care. to address goals. Recommendation for discharge: (in order for the patient to meet his/her long term goals)  Therapy 3 hours per day 5-7 days per week    This discharge recommendation:  Has been made in collaboration with the attending provider and/or case management    IF patient discharges home will need the following DME: bedside commode, rolling walker, and wheelchair       SUBJECTIVE:   Patient stated (Tearfully): I just can't do this. I can't move because I am so big. I don't know what I am going to do.     OBJECTIVE DATA SUMMARY:   Critical Behavior:  Neurologic State: Alert  Orientation Level: Oriented X4  Cognition: Appropriate decision making, Appropriate for age attention/concentration, Appropriate safety awareness, Follows commands  Safety/Judgement: Awareness of environment  Functional Mobility Training:  Bed Mobility:  Rolling: Moderate assistance  Supine to Sit: Moderate assistance; Adaptive equipment (use of strap)  Sit to Supine: Maximum assistance;Assist x2  Scooting: Maximum assistance;Assist x2        Transfers:  Sit to Stand: Moderate assistance;Assist x2;Adaptive equipment (RW)  Stand to Sit: Moderate assistance;Assist x2                             Balance:  Sitting: Impaired; Without support  Sitting - Static: Fair (occasional)  Sitting - Dynamic: Fair (occasional)  Standing: Impaired; With support  Standing - Static: Fair;Constant support  Standing - Dynamic : Poor;Constant support  Ambulation/Gait Training:                    Right Side Weight Bearing: Toe touch                      Therapeutic Exercises: Ankle Pumps  Heel Digs  Knee presses  Heel sides using strap  5 reps each    Pain Ratin/10    Activity Tolerance:   Fair    After treatment patient left in no apparent distress:   Supine in bed, Call bell within reach, Bed / chair alarm activated, Side rails x 3, and nurse notified, request for pain meds. COMMUNICATION/COLLABORATION:   The patients plan of care was discussed with: Occupational therapist and Registered nurse.      Mary Kate Reed   Time Calculation: 30 mins

## 2022-10-27 NOTE — WOUND CARE
WOCN Note    Visit for [facility] check. 10.24.2022 s/p Right Femur open reduction with Prevena VAC placement over incision. Right leg:  Patient has a Fred Somers attached to hospital VAC. Purple foam visible with suction at 125 mmHg without leak. Will follow as needed.     REGINALD HolmN RN [facility] Inpatient Wound Care  Available on [employer]  Office [phone]

## 2022-10-27 NOTE — PROGRESS NOTES
Problem: Falls - Risk of  Goal: *Absence of Falls  Description: Document Tressa Garrett Fall Risk and appropriate interventions in the flowsheet. Outcome: Progressing Towards Goal  Note: Fall Risk Interventions:  Mobility Interventions: Bed/chair exit alarm, Patient to call before getting OOB         Medication Interventions: Patient to call before getting OOB    Elimination Interventions: Bed/chair exit alarm    History of Falls Interventions: Bed/chair exit alarm         Problem: Patient Education: Go to Patient Education Activity  Goal: Patient/Family Education  Outcome: Progressing Towards Goal     Problem: Pressure Injury - Risk of  Goal: *Prevention of pressure injury  Description: Document Wilber Scale and appropriate interventions in the flowsheet.   Outcome: Progressing Towards Goal  Note: Pressure Injury Interventions:  Sensory Interventions: Assess changes in LOC, Float heels    Moisture Interventions: Absorbent underpads    Activity Interventions: PT/OT evaluation    Mobility Interventions: HOB 30 degrees or less    Nutrition Interventions: Offer support with meals,snacks and hydration    Friction and Shear Interventions: HOB 30 degrees or less                Problem: Patient Education: Go to Patient Education Activity  Goal: Patient/Family Education  Outcome: Progressing Towards Goal     Problem: Patient Education: Go to Patient Education Activity  Goal: Patient/Family Education  Outcome: Progressing Towards Goal     Problem: Patient Education: Go to Patient Education Activity  Goal: Patient/Family Education  Outcome: Progressing Towards Goal

## 2022-10-27 NOTE — PROGRESS NOTES
Problem: Self Care Deficits Care Plan (Adult)  Goal: *Acute Goals and Plan of Care (Insert Text)  Description: FUNCTIONAL STATUS PRIOR TO ADMISSION: Patient was modified independent using a single point cane for functional mobility. Hx of falls. Reports owning a WC and has a ramp as well. Mainly wears slip on shoes. HOME SUPPORT PRIOR TO ADMISSION: The patient lived with roommate but did not require assist. Patient lives on first floor of a two story home with 3 steps and bilateral rails to enter. Room mate lives with her but is disabled: can not help her physically, but can assist with IADLs (errands, groceries, etc). Occupational Therapy Goals  Initiated 10/25/2022  1. Patient will perform lower body dressing with moderate assistance and LRAD within 7 day(s). 2.  Patient will perform upper body dressing with minimal assistance/contact guard assist within 7 day(s). 3.  Patient will perform bathing with moderate assistance and LRAD within 7 day(s). 4.  Patient will perform toilet transfers with maximal assistance x1 within 7 day(s). 5.  Patient will perform all aspects of toileting with maximal assistance within 7 day(s). 6.  Patient will participate in upper extremity therapeutic exercise/activities with minimal assistance/contact guard assist for 5 minutes within 7 day(s). 7.  Patient will utilize energy conservation techniques during functional activities with verbal cues within 7 day(s). Outcome: Progressing Towards Goal      OCCUPATIONAL THERAPY TREATMENT  Patient: Theodora Michael (56 y.o. female)  Date: 10/27/2022  Diagnosis: Hip fracture ([facility] [address] Ashly Capone <principal problem not specified>  Procedure(s) (LRB):  RIGHT FEMUR RETROGRADE NAIL. FLAT SHARMAINE, TRIANGLES, C-ARM, PREVENA WOUND VAC. Shikha Leo. NEED SA.REQUEST TO FOLLOW (Right) 3 Days Post-Op  Precautions: Fall, TTWB (right LE/needs bariatric equipment)  Chart, occupational therapy assessment, plan of care, and goals were reviewed. ASSESSMENT  Patient continues with skilled OT services and is progressing towards goals. Patient continues to be limited by heightened pain in R knee with all activity, increased time required for all activity. Patient able to transition to EOB with overall mod A and use of leg strap/leg  and bed modified. Patient very fearful of falling and bending R knee despite maximal reassurance/encouragement/education. Brief bathing and grooming completed seated EOB, noted redness on back and scratches on backside - RN notified. Patient able to complete 2 rounds of standing at RW, unable to achieve full upright positioning 2/2 impaired balance and decreased BUE strength to offload weight from RLE. Patient required therapist assist and multimodal cueing to maintain TTWB prec. Patient fatiguing quickly, returned to supine in bed with max A x2, unable to successfully scoot laterally up toward [facility]. Patient labile during session, semi receptive to provided encouragement. Continue to recommend IPR at DC. Current Level of Function Impacting Discharge (ADLs): up to total A     Other factors to consider for discharge: TTWB status, below baseline, independent PLOF         PLAN :  Patient continues to benefit from skilled intervention to address the above impairments. Continue treatment per established plan of care to address goals. Recommendation for discharge: (in order for the patient to meet his/her long term goals)  Therapy 3 hours per day 5-7 days per week    This discharge recommendation:  Has been made in collaboration with the attending provider and/or case management    IF patient discharges home will need the following DME: defer to facility        SUBJECTIVE:   Patient stated \"I can give it a try.     OBJECTIVE DATA SUMMARY:   Cognitive/Behavioral Status:  Neurologic State: Alert  Orientation Level: Oriented X4  Cognition: Appropriate decision making; Appropriate for age attention/concentration; Appropriate safety awareness; Follows commands             Functional Mobility and Transfers for ADLs:  Bed Mobility:  Rolling: Moderate assistance  Supine to Sit: Moderate assistance  Sit to Supine: Maximum assistance;Assist x2  Scooting: Maximum assistance;Assist x2    Transfers:  Sit to Stand: Moderate assistance;Assist x2 (progresing to mod x1; unable to achieve full upright positioning)          Balance:  Sitting: Impaired  Sitting - Static: Fair (occasional)  Sitting - Dynamic: Fair (occasional)  Standing: Impaired  Standing - Static: Constant support; Fair  Standing - Dynamic : Poor;Constant support    ADL Intervention:       Grooming  Washing Face: Set-up    Upper Body Bathing  Bathing Assistance: Minimum assistance         Lower Body Bathing  Bathing Assistance: Moderate assistance (assist to wash backside)    Upper Body Dressing Assistance  Dressing Assistance: [address]: Minimum  assistance       Pain:  Pt endorsing increased pain in R knee with all activity. Did not quantify    Activity Tolerance:   Fair, requires frequent rest breaks, and pain behaviors    After treatment patient left in no apparent distress:   Supine in bed, Call bell within reach, Bed / chair alarm activated, and Side rails x 3    COMMUNICATION/COLLABORATION:   The patients plan of care was discussed with: Physical therapist, Occupational therapist, and Registered nurse.      Tima Garcia OT  Time Calculation: 38 mins

## 2022-10-28 LAB
ANION GAP SERPL CALC-SCNC: 5 MMOL/L (ref 5–15)
BASOPHILS # BLD: 0 K/UL (ref 0–0.1)
BASOPHILS NFR BLD: 0 % (ref 0–1)
BUN SERPL-MCNC: 12 MG/DL (ref 6–20)
BUN/CREAT SERPL: 16 (ref 12–20)
CALCIUM SERPL-MCNC: 9.1 MG/DL (ref 8.5–10.1)
CHLORIDE SERPL-SCNC: 103 MMOL/L (ref 97–108)
CO2 SERPL-SCNC: 30 MMOL/L (ref 21–32)
COVID-19 RAPID TEST, COVR: NOT DETECTED
CREAT SERPL-MCNC: 0.74 MG/DL (ref 0.55–1.02)
DIFFERENTIAL METHOD BLD: ABNORMAL
EOSINOPHIL # BLD: 0.4 K/UL (ref 0–0.4)
EOSINOPHIL NFR BLD: 6 % (ref 0–7)
ERYTHROCYTE [DISTWIDTH] IN BLOOD BY AUTOMATED COUNT: 14.3 % (ref 11.5–14.5)
GLUCOSE BLD STRIP.AUTO-MCNC: 136 MG/DL (ref 65–117)
GLUCOSE BLD STRIP.AUTO-MCNC: 149 MG/DL (ref 65–117)
GLUCOSE BLD STRIP.AUTO-MCNC: 187 MG/DL (ref 65–117)
GLUCOSE BLD STRIP.AUTO-MCNC: 207 MG/DL (ref 65–117)
GLUCOSE SERPL-MCNC: 152 MG/DL (ref 65–100)
HCT VFR BLD AUTO: 24.3 % (ref 35–47)
HGB BLD-MCNC: 7.9 G/DL (ref 11.5–16)
IMM GRANULOCYTES # BLD AUTO: 0 K/UL
IMM GRANULOCYTES NFR BLD AUTO: 0 %
LYMPHOCYTES # BLD: 1.2 K/UL (ref 0.8–3.5)
LYMPHOCYTES NFR BLD: 17 % (ref 12–49)
MAGNESIUM SERPL-MCNC: 1.8 MG/DL (ref 1.6–2.4)
MCH RBC QN AUTO: 33.1 PG (ref 26–34)
MCHC RBC AUTO-ENTMCNC: 32.5 G/DL (ref 30–36.5)
MCV RBC AUTO: 101.7 FL (ref 80–99)
MONOCYTES # BLD: 0.5 K/UL (ref 0–1)
MONOCYTES NFR BLD: 7 % (ref 5–13)
MYELOCYTES NFR BLD MANUAL: 1 %
NEUTS SEG # BLD: 5 K/UL (ref 1.8–8)
NEUTS SEG NFR BLD: 69 % (ref 32–75)
NRBC # BLD: 0.04 K/UL (ref 0–0.01)
NRBC BLD-RTO: 0.6 PER 100 WBC
PHOSPHATE SERPL-MCNC: 3.2 MG/DL (ref 2.6–4.7)
PLATELET # BLD AUTO: 210 K/UL (ref 150–400)
PMV BLD AUTO: 10.4 FL (ref 8.9–12.9)
POTASSIUM SERPL-SCNC: 3.7 MMOL/L (ref 3.5–5.1)
RBC # BLD AUTO: 2.39 M/UL (ref 3.8–5.2)
RBC MORPH BLD: ABNORMAL
SERVICE CMNT-IMP: ABNORMAL
SODIUM SERPL-SCNC: 138 MMOL/L (ref 136–145)
SOURCE, COVRS: NORMAL
WBC # BLD AUTO: 7.2 K/UL (ref 3.6–11)

## 2022-10-28 PROCEDURE — 85025 COMPLETE CBC W/AUTO DIFF WBC: CPT

## 2022-10-28 PROCEDURE — 74011000250 HC RX REV CODE- 250: Performed by: ORTHOPAEDIC SURGERY

## 2022-10-28 PROCEDURE — 36415 COLL VENOUS BLD VENIPUNCTURE: CPT

## 2022-10-28 PROCEDURE — 74011250637 HC RX REV CODE- 250/637: Performed by: HOSPITALIST

## 2022-10-28 PROCEDURE — 74011000250 HC RX REV CODE- 250: Performed by: INTERNAL MEDICINE

## 2022-10-28 PROCEDURE — 97110 THERAPEUTIC EXERCISES: CPT

## 2022-10-28 PROCEDURE — 97530 THERAPEUTIC ACTIVITIES: CPT

## 2022-10-28 PROCEDURE — 74011250637 HC RX REV CODE- 250/637: Performed by: ORTHOPAEDIC SURGERY

## 2022-10-28 PROCEDURE — 74011250637 HC RX REV CODE- 250/637: Performed by: PHYSICIAN ASSISTANT

## 2022-10-28 PROCEDURE — 82962 GLUCOSE BLOOD TEST: CPT

## 2022-10-28 PROCEDURE — 74011250637 HC RX REV CODE- 250/637: Performed by: NURSE PRACTITIONER

## 2022-10-28 PROCEDURE — 80048 BASIC METABOLIC PNL TOTAL CA: CPT

## 2022-10-28 PROCEDURE — 87635 SARS-COV-2 COVID-19 AMP PRB: CPT

## 2022-10-28 PROCEDURE — 84100 ASSAY OF PHOSPHORUS: CPT

## 2022-10-28 PROCEDURE — 83735 ASSAY OF MAGNESIUM: CPT

## 2022-10-28 PROCEDURE — 65270000046 HC RM TELEMETRY

## 2022-10-28 PROCEDURE — 77030038269 HC DRN EXT URIN PURWCK BARD -A

## 2022-10-28 PROCEDURE — 74011636637 HC RX REV CODE- 636/637: Performed by: PHYSICIAN ASSISTANT

## 2022-10-28 RX ORDER — SERTRALINE HYDROCHLORIDE 100 MG/1
100 TABLET, FILM COATED ORAL DAILY
Qty: 30 TABLET | Refills: 0 | Status: SHIPPED
Start: 2022-10-29

## 2022-10-28 RX ORDER — METFORMIN HYDROCHLORIDE 500 MG/1
500 TABLET, FILM COATED, EXTENDED RELEASE ORAL DAILY
Qty: 30 MG | Refills: 0 | Status: SHIPPED
Start: 2022-10-28

## 2022-10-28 RX ORDER — FERROUS SULFATE, DRIED 160(50) MG
1 TABLET, EXTENDED RELEASE ORAL
Qty: 90 TABLET | Refills: 0 | Status: SHIPPED
Start: 2022-10-29

## 2022-10-28 RX ORDER — ACETAMINOPHEN 500 MG
1000 TABLET ORAL
Qty: 30 TABLET | Refills: 0 | Status: SHIPPED | OUTPATIENT
Start: 2022-10-28

## 2022-10-28 RX ORDER — CYCLOBENZAPRINE HCL 10 MG
10 TABLET ORAL
Qty: 60 TABLET | Refills: 0 | Status: SHIPPED
Start: 2022-10-28

## 2022-10-28 RX ORDER — AMOXICILLIN 250 MG
1 CAPSULE ORAL 2 TIMES DAILY
Qty: 60 TABLET | Refills: 0 | Status: SHIPPED
Start: 2022-10-28

## 2022-10-28 RX ADMIN — Medication 2 UNITS: at 11:29

## 2022-10-28 RX ADMIN — APIXABAN 5 MG: 5 TABLET, FILM COATED ORAL at 19:29

## 2022-10-28 RX ADMIN — CYCLOBENZAPRINE 10 MG: 10 TABLET, FILM COATED ORAL at 16:22

## 2022-10-28 RX ADMIN — ZOLPIDEM TARTRATE 5 MG: 5 TABLET ORAL at 00:01

## 2022-10-28 RX ADMIN — HYDROMORPHONE HYDROCHLORIDE 4 MG: 4 TABLET ORAL at 04:42

## 2022-10-28 RX ADMIN — SENNOSIDES AND DOCUSATE SODIUM 1 TABLET: 50; 8.6 TABLET ORAL at 10:04

## 2022-10-28 RX ADMIN — SERTRALINE 100 MG: 50 TABLET, FILM COATED ORAL at 10:03

## 2022-10-28 RX ADMIN — HYDROMORPHONE HYDROCHLORIDE 4 MG: 4 TABLET ORAL at 23:20

## 2022-10-28 RX ADMIN — HYDROMORPHONE HYDROCHLORIDE 4 MG: 4 TABLET ORAL at 14:33

## 2022-10-28 RX ADMIN — OYSTER SHELL CALCIUM WITH VITAMIN D 1 TABLET: 500; 200 TABLET, FILM COATED ORAL at 11:29

## 2022-10-28 RX ADMIN — ROSUVASTATIN CALCIUM 20 MG: 10 TABLET, COATED ORAL at 22:17

## 2022-10-28 RX ADMIN — ACETAMINOPHEN 1000 MG: 500 TABLET ORAL at 16:23

## 2022-10-28 RX ADMIN — METOPROLOL TARTRATE 50 MG: 50 TABLET ORAL at 10:04

## 2022-10-28 RX ADMIN — OYSTER SHELL CALCIUM WITH VITAMIN D 1 TABLET: 500; 200 TABLET, FILM COATED ORAL at 16:24

## 2022-10-28 RX ADMIN — ACETAMINOPHEN 1000 MG: 500 TABLET ORAL at 06:55

## 2022-10-28 RX ADMIN — PANTOPRAZOLE SODIUM 40 MG: 40 TABLET, DELAYED RELEASE ORAL at 06:55

## 2022-10-28 RX ADMIN — HYDROMORPHONE HYDROCHLORIDE 4 MG: 4 TABLET ORAL at 10:05

## 2022-10-28 RX ADMIN — SENNOSIDES AND DOCUSATE SODIUM 1 TABLET: 50; 8.6 TABLET ORAL at 19:29

## 2022-10-28 RX ADMIN — SODIUM CHLORIDE, PRESERVATIVE FREE 10 ML: 5 INJECTION INTRAVENOUS at 14:00

## 2022-10-28 RX ADMIN — HYDROMORPHONE HYDROCHLORIDE 4 MG: 4 TABLET ORAL at 00:01

## 2022-10-28 RX ADMIN — OYSTER SHELL CALCIUM WITH VITAMIN D 1 TABLET: 500; 200 TABLET, FILM COATED ORAL at 08:00

## 2022-10-28 RX ADMIN — ACETAMINOPHEN 1000 MG: 500 TABLET ORAL at 00:01

## 2022-10-28 RX ADMIN — Medication 3 UNITS: at 16:22

## 2022-10-28 RX ADMIN — HYDROMORPHONE HYDROCHLORIDE 4 MG: 4 TABLET ORAL at 19:29

## 2022-10-28 RX ADMIN — METOPROLOL TARTRATE 50 MG: 50 TABLET ORAL at 19:29

## 2022-10-28 RX ADMIN — APIXABAN 5 MG: 5 TABLET, FILM COATED ORAL at 10:04

## 2022-10-28 RX ADMIN — POLYETHYLENE GLYCOL 3350 17 G: 17 POWDER, FOR SOLUTION ORAL at 10:03

## 2022-10-28 NOTE — PROGRESS NOTES
Report given to JAYME SPENCER RN at [facility]. Report included SBAR and MAR.  Patient to be discharged via [facility] stretcher transport by 5:15PM.

## 2022-10-28 NOTE — PROGRESS NOTES
10/28/2022; 1630 -   CM received call from Vero Franklin [address] [place] [address] [place]: [phone]) requesting to fax patient's discharge summary to the facility at confirmed fax:    F: [phone]    CRM: Grant Adames, MPH, [facility] 18.: [phone] or [phone]

## 2022-10-28 NOTE — PROGRESS NOTES
Problem: Mobility Impaired (Adult and Pediatric)  Goal: *Acute Goals and Plan of Care (Insert Text)  Description: FUNCTIONAL STATUS PRIOR TO ADMISSION: Patient was modified independent using a single point cane for functional mobility. HOME SUPPORT PRIOR TO ADMISSION: The patient lived with roommate but did not require assist. Patient lives on first floor of a two story home with 3 steps and bilateral rails to enter. Room mate lives with her but is disabled: can not help her physically, but can assist with ADLs (errands, groceries, etc). Physical Therapy Goals  Initiated 10/25/2022    1. Patient will move from supine to sit and sit to supine , scoot up and down, and roll side to side in bed with minimal assistance within 7 days. 2. Patient will perform sit to stand and chair transfer with RW and minimal assistance within 7 days. 3. Patient will perform post-op hip exercise home exercise program per protocol with independence within 7 days. Outcome: Progressing Towards Goal   PHYSICAL THERAPY TREATMENT  Patient: Bernardo Willis (89 y.o. female)  Date: 10/28/2022  Diagnosis: Hip fracture ([address] Tracie Conti <principal problem not specified>  Procedure(s) (LRB):  RIGHT FEMUR RETROGRADE NAIL. FLAT SHARMAINE, TRIANGLES, C-ARM, PREVENA WOUND VAC. Sophronia Levans. NEED SA.REQUEST TO FOLLOW (Right) 4 Days Post-Op  Precautions: Fall, TTWB (right LE/needs bariatric equipment)  Chart, physical therapy assessment, plan of care and goals were reviewed. ASSESSMENT  Patient continues with skilled PT services and is gradually progressing towards goals this session. She was bale to transfer to EOB (HOB elevated, bed rails) w/ decreased time compared to previous session and w/ Min/Mod Ax2 for RLE support w/ descent to floor and assist/support at trunk; Mod A for scooting of hips to EOB. She did complete sit<>stand x1 and stand pivot transfer to chair towards her left side.  Noted minimal WBing through heel as pt keeping toes up off floor when completing transfer. A ofelia pad was placed in chair prior to pt sitting in preparation for transfer back to bed. Pt agreeable to sit up for approx 30 min at minimum but no more than 2hrs. Noted pt to d/c later today to Rehab. Current Level of Function Impacting Discharge (mobility/balance): Min-Mod Ax2 for functional transfers w/ RW    Other factors to consider for discharge: TTWBing         PLAN :  Patient continues to benefit from skilled intervention to address the above impairments. Continue treatment per established plan of care. to address goals. Recommendation for discharge: (in order for the patient to meet his/her long term goals)  Therapy 3 hours per day 5-7 days per week    This discharge recommendation:  Has been made in collaboration with the attending provider and/or case management    IF patient discharges home will need the following DME: rolling walker       SUBJECTIVE:   Patient stated Is this thing going to fall? Roya Castillo referring to leg rest of recliner. OBJECTIVE DATA SUMMARY:   Critical Behavior:  Neurologic State: Alert  Orientation Level: Oriented X4  Cognition: Appropriate decision making, Appropriate for age attention/concentration, Appropriate safety awareness, Follows commands  Safety/Judgement: Awareness of environment  Functional Mobility Training:  Bed Mobility:     Supine to Sit: Moderate assistance; Additional time;Assist x2;Bed Modified (pt initially using gait belt initially for transfer to EOB, however pt rewuired phys assist/support w/ descent to floor.)  Sit to Supine:  (remained OOB in chair)  Scooting: Moderate assistance;Assist x1        Transfers:  Sit to Stand: Moderate assistance;Assist x2;Minimum assistance (bed elevated to pt comfort)  Stand to Sit: Minimum assistance;Assist x2  Stand Pivot Transfers: Minimum assistance; Moderate assistance;Assist x2     Bed to Chair: Minimum assistance; Moderate assistance;Assist x2 Balance:  Sitting: Impaired  Sitting - Static: Fair (occasional)  Sitting - Dynamic: Fair (occasional)  Standing: Impaired; With support  Standing - Static: Constant support; Fair  Standing - Dynamic : Constant support  Ambulation/Gait Training:          Right Side Weight Bearing: Toe touch  Left Side Weight Bearing: As tolerated       Therapeutic Exercises:   Lateral weight shift to assist w/ scoot. Pain Ratin-7/10/ w/ activity    Activity Tolerance:   Fair    After treatment patient left in no apparent distress:   Sitting in chair and Call bell within reach    COMMUNICATION/COLLABORATION:   The patients plan of care was discussed with: Occupational therapist and Registered nurse.      Iliana Hightower,PTA   Time Calculation: 32 mins

## 2022-10-28 NOTE — PROGRESS NOTES
10/28/2022; 1840 -   CM was notified by nursing that patient's transport has not presented at this time. CM contacted [facility] ([phone], Option 1) regarding Trip ID: [id number]. Rep identified that the transport has not been assigned at this time due to the distance of the trip. Rep identified plan to follow up with dispatch to check on the status of the request.  CM requested for the rep to update the unit directly and provided unit number () to provide updates directly to nursing. Bedside nurse is aware of the above. CRM: Kati Gould, MPH, [facility]  Z: [phone] or [phone]    [facility] -    received call from [facility] Miri Bernal) who identified that the company has not been able to secure a transport for the patient tonight, 10/28. (Supervisor, Phuong Scott, Provider Line: [phone], Option 2)     Per [facility], patient has been verified for a 10/29 1000 transport with [employer]. Nursing is aware of the above.   CRM: Kati Gould, MPH, Iesha WISEMAN 18.: [phone] or [phone]

## 2022-10-28 NOTE — WOUND CARE
WOCN Note     Visit to switch Prevena dressing to [facility] for discharge. 10.24.2022 s/p Right Femur open reduction with Prevena VAC placement over incision. Right leg wound:  Hospital vac removed and Prevena dressing attached to [facility] [address]. Purple foam visible with suction at 125 mmHg without leak. Education, 2  extra canisters and handouts provided to patient.      REGINALD SalazarN RN [facility] Inpatient Wound Care  Available on [facility]  Office [phone]

## 2022-10-28 NOTE — PROGRESS NOTES
Progress Note    Status Post: right Femur Intramedullary nail  Date of Surgery: 10/24/22  Surgeon: Dr. Hernández Friend    Subjective:     No acute events over night. Marivel Santa states that she is doing ok. She says her pain comes and goes. The pain medications are effective. When asked how ambulating was going, she said it has been rough. She was able to sleep overnight which is some progress    Denies CP, SOB, nausea/vomitting, parasthesias. Objective:   Visit Vitals  /76 (BP 1 Location: Right lower arm)   Pulse 67   Temp 97.9 °F (36.6 °C)   Resp 18   Ht 5' 4\" (1.626 m)   Wt 150 kg (330 lb 11 oz)   SpO2 97%   BMI 56.76 kg/m²       Patient Vitals for the past 24 hrs:   Temp Pulse Resp BP SpO2   10/28/22 0600 -- 67 -- -- --   10/28/22 0418 -- 70 -- -- --   10/28/22 0413 97.9 °F (36.6 °C) 68 18 122/76 97 %   10/28/22 0200 -- 65 -- -- --   10/27/22 2212 -- 66 -- -- --   10/27/22 2035 98.3 °F (36.8 °C) 68 18 119/72 96 %   10/27/22 2000 -- 69 -- -- --   10/27/22 1836 -- 81 -- 105/67 --   10/27/22 1818 -- 83 -- -- --   10/27/22 1544 98.9 °F (37.2 °C) 75 18 109/67 95 %   10/27/22 1200 -- 73 -- -- --   10/27/22 1000 -- 84 -- -- --   10/27/22 0845 98.9 °F (37.2 °C) 89 16 128/77 94 %        Physical Exam:  Gen: NAD, AAOx3  Resp: No acute respiratory distress  MSK:  RLE:  Dressings clean, dry, and intact  Wound vacuum intact with no drainage in the canister.   Motor intact distally  Sensation is intact to light touch distally    Ice was replaced over the knee              Intake/Output Summary (Last 24 hours) at 10/28/2022 0805  Last data filed at 10/28/2022 0413  Gross per 24 hour   Intake 240 ml   Output 1600 ml   Net -1360 ml       LABS :  Recent Results (from the past 24 hour(s))   GLUCOSE, POC    Collection Time: 10/27/22 11:20 AM   Result Value Ref Range    Glucose (POC) 181 (H) 65 - 117 mg/dL    Performed by Yadi Leblanc    GLUCOSE, POC    Collection Time: 10/27/22  4:19 PM   Result Value Ref Range Glucose (POC) 171 (H) 65 - 117 mg/dL    Performed by Frandy Mesa, POC    Collection Time: 10/27/22 10:56 PM   Result Value Ref Range    Glucose (POC) 190 (H) 65 - 117 mg/dL    Performed by Eleuterio Gray    Collection Time: 10/28/22  4:23 AM   Result Value Ref Range    Magnesium 1.8 1.6 - 2.4 mg/dL   PHOSPHORUS    Collection Time: 10/28/22  4:23 AM   Result Value Ref Range    Phosphorus 3.2 2.6 - 4.7 MG/DL   CBC WITH AUTOMATED DIFF    Collection Time: 10/28/22  4:23 AM   Result Value Ref Range    WBC 7.2 3.6 - 11.0 K/uL    RBC 2.39 (L) 3.80 - 5.20 M/uL    HGB 7.9 (L) 11.5 - 16.0 g/dL    HCT 24.3 (L) 35.0 - 47.0 %    .7 (H) 80.0 - 99.0 FL    MCH 33.1 26.0 - 34.0 PG    MCHC 32.5 30.0 - 36.5 g/dL    RDW 14.3 11.5 - 14.5 %    PLATELET 062 673 - 152 K/uL    MPV 10.4 8.9 - 12.9 FL    NRBC 0.6 (H) 0  WBC    ABSOLUTE NRBC 0.04 (H) 0.00 - 0.01 K/uL    NEUTROPHILS 69 32 - 75 %    LYMPHOCYTES 17 12 - 49 %    MONOCYTES 7 5 - 13 %    EOSINOPHILS 6 0 - 7 %    BASOPHILS 0 0 - 1 %    MYELOCYTES 1 (H) 0 %    IMMATURE GRANULOCYTES 0 %    ABS. NEUTROPHILS 5.0 1.8 - 8.0 K/UL    ABS. LYMPHOCYTES 1.2 0.8 - 3.5 K/UL    ABS. MONOCYTES 0.5 0.0 - 1.0 K/UL    ABS. EOSINOPHILS 0.4 0.0 - 0.4 K/UL    ABS. BASOPHILS 0.0 0.0 - 0.1 K/UL    ABS. IMM.  GRANS. 0.0 K/UL    DF MANUAL      RBC COMMENTS MACROCYTOSIS  1+       METABOLIC PANEL, BASIC    Collection Time: 10/28/22  4:23 AM   Result Value Ref Range    Sodium 138 136 - 145 mmol/L    Potassium 3.7 3.5 - 5.1 mmol/L    Chloride 103 97 - 108 mmol/L    CO2 30 21 - 32 mmol/L    Anion gap 5 5 - 15 mmol/L    Glucose 152 (H) 65 - 100 mg/dL    BUN 12 6 - 20 MG/DL    Creatinine 0.74 0.55 - 1.02 MG/DL    BUN/Creatinine ratio 16 12 - 20      eGFR >60 >60 ml/min/1.73m2    Calcium 9.1 8.5 - 10.1 MG/DL   GLUCOSE, POC    Collection Time: 10/28/22  6:45 AM   Result Value Ref Range    Glucose (POC) 136 (H) 65 - 117 mg/dL    Performed by Anne Cline         Assessment: Kellen Antony is a 63y.o. year-old female with right Femur Fracture status post Intramedullary nail by myself POD # 4    Plan:   -Liliya Self: TTWB RLE  -DVT prophylaxis: SCDs, frequent ambulation, Eliquis twice daily resuming today  -Antibiotics: completed  -Pain control: Continue as needed pain management, ice to operative area, elevate  -PT/OT for mobilization  -Dispo: Discharge planning to Home when ready  -Continue Prevena wound VAC. At time of discharge, please transition hospital unit to home unit. Keep in place after discharge for 7 days when the battery will die. Then home nursing can change to dry, sterile dressing  -Ice, elevate  Follow up with me in office in 10-14 days        Jessica Fraga MD    10/28/22  8:05 AM        Jessica Fraga M.D.   [facility] Office  Cell: [phone]  Office: [phone]  Medical Staff: Oscar Lorenzo MA

## 2022-10-28 NOTE — PROGRESS NOTES
Transition Of Care: IPR referrals pending. BLS to transport when bed secured and patient is stable for discharge. Insurance Kelly Evan will be required for admission. RUR: 11%    [facility]: Referral under review. Rolly Camarillo with admissions to follow up with CM. CM spoke with Jens Wadsworth: [phone] this am.     [facility]: Referral under review. CM spoke with Kasia marinelli [facility]: ([phone]). [address]: Phone: [phone], fax: [phone]. CM spoke with Owen Ortega and faxed clinicals. Owen Ortega stated they do no have any medicaid beds available at this time. CM following for discharge needs.     Jose Roberto Aceves RN/CRM

## 2022-10-28 NOTE — PROGRESS NOTES
[address] [facility] Adult  Hospitalist Group                                                                                          Hospitalist Progress Note  Lexy Lomax NP  Answering service: [phone] from in house phone        Date of Service:  10/28/2022  NAME:  Lynn Williamson  :  1960  MRN:  [id number]      Admission Summary:   Lynn Williamson is a 63 y.o. female with a PMH of AFIB/Flutter on eliquis, CKD II, SAMMIE on CPAP, Osteoarthritis, Right Knee Replacement x2 years ago, Morbid Obesity who presented to ED with complaint of right knee pain after mechanical fall at home, tripped and fall in the kitchen on her right knee on 10/21. Initially evaluated in outside hospital, XR right knee: right femur fracture closed displaced comminuted fracture periprosthetic, transferred to [facility] for further evaluation and treatment and orthopedic consultation. The patient denied head trauma, loss of conscious, fever, chest pain, shortness of breath, productive cough, nausea, vomiting, diarrhea. Interval history / Subjective: Follow-up for issues listed below.   -Patient seen and examined, no c/o's. Assessment & Plan:     Right Femoral Fracture/Diaphysis/Periprosthetic: s/p mechanical fall at home  -XR right femur 10/22: femoral shaft fracture  -XR right femur 10/24: 9 intraoperative spot fluoroscopic views during right femur ORIF, improved alignment of distal femoral diaphyseal fracture. -PT/OT  -Ortho sx Dr. Alecia Fisher: 10/24 Right Femur Intramedullary Nail. Continue Prevena wound VAC. At discharge: please transition unit to home unit. Keep in place after discharge for 7 days when the battery will die. Then home nursing can change to dry, sterile dressing, Ice, elevate. Follow up with Dr. Cale Fernandez in office in 10-14 days. Intractable Pain Right Femur: pain control, monitor closely. Bowel regimen. AFIB/A Flutter: continue Eliquis, telemetry-on admit, rate stable.   -EKG 10/23: Atrial fibrillation with rapid ventricular response with premature ventricular or aberrantly conducted complexes. Nonspecific ST and T wave abnormality. -ECHO 10/24: Normal left ventricular SF, EF 55 - 60%. Left & Right ventricle size is normal, left ventricle normal wall thickness. Normal diastolic function. -CXR 10/22: cardiomegally, no acute cardiopulmonary process. CKD II: monitor renal fx, renal dose meds, avoid renal toxic agents/NSAIDS. Hyperglycemia: Hga1c 6.3(10/26), ISS ac&hs, hypoglycemic protocol. PCP follow. SAMMIE on CPAP: home CPAP settings, continue at hs. Morbid Obesity: BMI 56.76, low calorie, low carb, low fat, low na advised. Osteoarthritis: pain control, PT/OT, OsCal.   Anxiety: zoloft          DVTppx: eliquis BID  GIppx: protonix  Code Status: Full Code  Diet: Cardiac  Activity: OOB to chair TID and PRN, PT/OT(Weightbearing: TTWB RLE)  Discharge: SNF vs IPR vs return home  Ambulates: walker/cane     Hospital Problems  Never Reviewed            Codes Class Noted POA    Hip fracture [facility]) ICD-10-CM: S72.009A  ICD-9-CM: 820.8  10/22/2022 Unknown             Review of Systems:   A comprehensive review of systems was negative except for that written in the HPI. Vital Signs:    Last 24hrs VS reviewed since prior progress note. Most recent are:  Visit Vitals  /75 (BP 1 Location: Right upper arm, BP Patient Position: At rest)   Pulse 79   Temp 98.5 °F (36.9 °C)   Resp 16   Ht 5' 4\" (1.626 m)   Wt 150 kg (330 lb 11 oz)   SpO2 98%   BMI 56.76 kg/m²         Intake/Output Summary (Last 24 hours) at 10/28/2022 1304  Last data filed at 10/28/2022 0413  Gross per 24 hour   Intake --   Output 1600 ml   Net -1600 ml        Physical Examination:     I had a face to face encounter with this patient and independently examined them on 10/28/2022 as outlined below:          Constitutional:  No acute distress, cooperative, pleasant    ENT:  Oral mucosa moist.    Resp:  CTA bilaterally.  No wheezing/rhonchi/rales. No accessory muscle use. CV:  Regular rhythm, normal rate, S1,S2.    GI/:  Soft, obese non tender, no guarding, BS present. Voids Freely. Musculoskeletal:  BLE trace edema, warm. RLE:dressing CDI, wound vac in place. Neurologic:  Moves all extremities. AAOx3. Skin:  w/d. Psych:  Good insight, Not anxious nor agitated. Data Review:    Review and/or order of clinical lab test      Labs:     Recent Labs     10/28/22  0423 10/27/22  0231   WBC 7.2 8.2   HGB 7.9* 7.7*   HCT 24.3* 23.7*    191     Recent Labs     10/28/22  0423 10/27/22  0231 10/26/22  0300    134* 134*   K 3.7 3.7 3.6    102 100   CO2 30 28 25   BUN 12 15 14   CREA 0.74 0.79 0.91   * 152* 169*   CA 9.1 9.2 8.6   MG 1.8 1.7 1.8   PHOS 3.2 2.0* 1.9*     No results for input(s): ALT, AP, TBIL, TBILI, TP, ALB, GLOB, GGT, AML, LPSE in the last 72 hours. No lab exists for component: SGOT, GPT, AMYP, HLPSE  No results for input(s): INR, PTP, APTT, INREXT in the last 72 hours. No results for input(s): FE, TIBC, PSAT, FERR in the last 72 hours. No results found for: FOL, RBCF   No results for input(s): PH, PCO2, PO2 in the last 72 hours. No results for input(s): CPK, CKNDX, TROIQ in the last 72 hours.     No lab exists for component: CPKMB  No results found for: CHOL, CHOLX, CHLST, CHOLV, HDL, HDLP, LDL, LDLC, DLDLP, TGLX, TRIGL, TRIGP, CHHD, CHHDX  Lab Results   Component Value Date/Time    Glucose (POC) 187 (H) 10/28/2022 10:55 AM    Glucose (POC) 136 (H) 10/28/2022 06:45 AM    Glucose (POC) 190 (H) 10/27/2022 10:56 PM    Glucose (POC) 171 (H) 10/27/2022 04:19 PM    Glucose (POC) 181 (H) 10/27/2022 11:20 AM     No results found for: COLOR, APPRN, SPGRU, REFSG, SHANELLE, PROTU, GLUCU, KETU, BILU, UROU, WOLF, LEUKU, GLUKE, EPSU, BACTU, WBCU, RBCU, CASTS, UCRY      Medications Reviewed:     Current Facility-Administered Medications   Medication Dose Route Frequency    0.9% sodium chloride infusion 250 mL  250 mL IntraVENous PRN    insulin lispro (HUMALOG) injection   SubCUTAneous AC&HS    dextrose 10 % infusion 0-250 mL  0-250 mL IntraVENous PRN    glucose chewable tablet 16 g  4 Tablet Oral PRN    glucagon (GLUCAGEN) injection 1 mg  1 mg IntraMUSCular PRN    dextrose 10 % infusion 0-250 mL  0-250 mL IntraVENous PRN    spironolactone (ALDACTONE) tablet 12.5 mg  12.5 mg Oral DAILY    pantoprazole (PROTONIX) tablet 40 mg  40 mg Oral ACB    rosuvastatin (CRESTOR) tablet 20 mg  20 mg Oral QHS    sertraline (ZOLOFT) tablet 100 mg  100 mg Oral DAILY    lisinopriL (PRINIVIL, ZESTRIL) tablet 5 mg  5 mg Oral DAILY    sodium chloride (NS) flush 5-40 mL  5-40 mL IntraVENous Q8H    sodium chloride (NS) flush 5-40 mL  5-40 mL IntraVENous PRN    naloxone (NARCAN) injection 0.4 mg  0.4 mg IntraVENous PRN    calcium-vitamin D (OS-HELEN +D3) 500 mg-200 unit per tablet 1 Tablet  1 Tablet Oral TID WITH MEALS    senna-docusate (PERICOLACE) 8.6-50 mg per tablet 1 Tablet  1 Tablet Oral BID    polyethylene glycol (MIRALAX) packet 17 g  17 g Oral DAILY    bisacodyL (DULCOLAX) suppository 10 mg  10 mg Rectal DAILY PRN    apixaban (ELIQUIS) tablet 5 mg  5 mg Oral BID    metoprolol tartrate (LOPRESSOR) tablet 50 mg  50 mg Oral BID    cyclobenzaprine (FLEXERIL) tablet 10 mg  10 mg Oral TID PRN    HYDROmorphone (DILAUDID) tablet 2 mg  2 mg Oral Q4H PRN    zolpidem (AMBIEN) tablet 5 mg  5 mg Oral QHS PRN    sodium chloride (NS) flush 5-40 mL  5-40 mL IntraVENous Q8H    sodium chloride (NS) flush 5-40 mL  5-40 mL IntraVENous PRN    polyethylene glycol (MIRALAX) packet 17 g  17 g Oral DAILY PRN    ondansetron (ZOFRAN ODT) tablet 4 mg  4 mg Oral Q8H PRN    Or    ondansetron (ZOFRAN) injection 4 mg  4 mg IntraVENous Q6H PRN    hydrOXYzine HCL (ATARAX) tablet 10 mg  10 mg Oral TID PRN    acetaminophen (TYLENOL) tablet 1,000 mg  1,000 mg Oral Q6H    HYDROmorphone (DILAUDID) tablet 4 mg  4 mg Oral Q4H PRN ______________________________________________________________________  EXPECTED LENGTH OF STAY: 2d 19h  ACTUAL LENGTH OF STAY:          [address], NP

## 2022-10-28 NOTE — PROGRESS NOTES
Problem: Self Care Deficits Care Plan (Adult)  Goal: *Acute Goals and Plan of Care (Insert Text)  Description: FUNCTIONAL STATUS PRIOR TO ADMISSION: Patient was modified independent using a single point cane for functional mobility. Hx of falls. Reports owning a WC and has a ramp as well. Mainly wears slip on shoes. HOME SUPPORT PRIOR TO ADMISSION: The patient lived with roommate but did not require assist. Patient lives on first floor of a two story home with 3 steps and bilateral rails to enter. Room mate lives with her but is disabled: can not help her physically, but can assist with IADLs (errands, groceries, etc). Occupational Therapy Goals  Initiated 10/25/2022  1. Patient will perform lower body dressing with moderate assistance and LRAD within 7 day(s). 2.  Patient will perform upper body dressing with minimal assistance/contact guard assist within 7 day(s). 3.  Patient will perform bathing with moderate assistance and LRAD within 7 day(s). 4.  Patient will perform toilet transfers with maximal assistance x1 within 7 day(s). 5.  Patient will perform all aspects of toileting with maximal assistance within 7 day(s). 6.  Patient will participate in upper extremity therapeutic exercise/activities with minimal assistance/contact guard assist for 5 minutes within 7 day(s). 7.  Patient will utilize energy conservation techniques during functional activities with verbal cues within 7 day(s). Outcome: Progressing Towards Goal     OCCUPATIONAL THERAPY TREATMENT  Patient: Karen Stone (62 y.o. female)  Date: 10/28/2022  Diagnosis: [facility] [address] <principal problem not specified>  Procedure(s) (LRB):  RIGHT FEMUR RETROGRADE NAIL. FLAT SHARMAINE, TRIANGLES, C-ARM, PREVENA WOUND VAC. [facility]. NEED SA.REQUEST TO FOLLOW (Right) 4 Days Post-Op  Precautions: Fall, TTWB (right LE/needs bariatric equipment)  Chart, occupational therapy assessment, plan of care, and goals were reviewed. ASSESSMENT  Patient continues with skilled OT services and is progressing towards goals. Patient with increased activity tolerance and adherence to TTWB precautions on this date. Patient able to transition to EOB with mod A x2 and increased time, continues to have difficulty scooting toward EOB. Patient agreeable to transition > recliner chair, drop arm feature utilized. Patient able to transition to standing, increased time to shuffle LLE toward L side cues provided to offload BLEs via RW. Patient able to descend to recliner chair with overall min A x2 to guide hips. BLEs elevated, ofelia pad placed underneath patient, and call bell within reach at conclusion of session. Recommend use of lift team to return patient to bed, RN notified. Continue to recommend IPR as patient continues to be highly motivated, requires up to total A for ADLs, and is well below baseline LOF. Current Level of Function Impacting Discharge (ADLs): up to total A     Other factors to consider for discharge: below baseline, RLE NWB         PLAN :  Patient continues to benefit from skilled intervention to address the above impairments. Continue treatment per established plan of care to address goals. Recommendation for discharge: (in order for the patient to meet his/her long term goals)  Therapy 3 hours per day 5-7 days per week    This discharge recommendation:  Has been made in collaboration with the attending provider and/or case management    IF patient discharges home will need the following DME: bedside commode, hospital bed, shower chair, walker: rolling, wheelchair, and bariatric equipment required       SUBJECTIVE:   Patient stated was today better than yesterday?     OBJECTIVE DATA SUMMARY:   Cognitive/Behavioral Status:  Neurologic State: Alert  Orientation Level: Oriented X4                Functional Mobility and Transfers for ADLs:  Bed Mobility:  Supine to Sit: Moderate assistance; Additional time;Assist x2;Bed Modified (pt initially using gait belt initially for transfer to EOB, however pt rewuired phys assist/support w/ descent to floor.)  Sit to Supine:  (remained OOB in chair)  Scooting: Moderate assistance;Assist x1    Transfers:  Sit to Stand: Moderate assistance;Assist x2;Minimum assistance (bed elevated to pt comfort)     Bed to Chair: Minimum assistance; Moderate assistance;Assist x2    Balance:  Sitting: Impaired  Sitting - Static: Fair (occasional)  Sitting - Dynamic: Fair (occasional)  Standing: Impaired; With support  Standing - Static: Constant support; Fair  Standing - Dynamic : Constant support    ADL Intervention:   Patient continues to require up to total A for ADL tasks. Pain:  Pt endorsing 7-8/10 pain with activity. Increased RLE support provided    Activity Tolerance:   Fair and requires rest breaks    After treatment patient left in no apparent distress:   Sitting in chair, Call bell within reach, and BLEs elevated    COMMUNICATION/COLLABORATION:   The patients plan of care was discussed with: Physical therapist, Occupational therapist, and Registered nurse.      Shelly Wood OT  Time Calculation: 36 mins

## 2022-10-28 NOTE — DISCHARGE SUMMARY
Discharge Summary       PATIENT ID: Theodora Michael  MRN: [id number]   YOB: 1960    DATE OF ADMISSION: 10/22/2022  2:11 PM    DATE OF DISCHARGE: 10/28/2022    PRIMARY CARE PROVIDER: None     ATTENDING PHYSICIAN: Olga Lidia Barriga MD  DISCHARGING PROVIDER: Adina Dyer NP    To contact this individual call [phone] and ask the  to page. If unavailable ask to be transferred the Adult Hospitalist Department. CONSULTATIONS: IP CONSULT TO ORTHOPEDIC SURGERY    PROCEDURES/SURGERIES: Procedure(s):  RIGHT FEMUR RETROGRADE NAIL. FLAT SHARMAINE, TRIANGLES, C-ARM, PREVENA WOUND VAC. Shik[facility] Leo. NEED SA.REQUEST TO FOLLOW    DISCHARGE DIAGNOSES:     Right Femoral Fracture/Diaphysis/Periprosthetic, Intractable Pain Right Femur, AFIB/A Flutter(Eliquis), CKD II, Hyperglycemia, SAMMIE on CPAP, Morbid Obesity, Osteoarthritis, Anxiety    ADMISSION SUMMARY AND HOSPITAL COURSE:   Theodora Michael is a 63 y.o. female with a PMH of AFIB/Flutter on eliquis, CKD II, SAMMIE on CPAP, Osteoarthritis, Right Knee Replacement x2 years ago, Morbid Obesity who presented to ED with complaint of right knee pain after mechanical fall at home, tripped and fall in the kitchen on her right knee on 10/21. Initially evaluated in outside hospital, XR right knee: right femur fracture closed displaced comminuted fracture periprosthetic, transferred to [facility] for further evaluation and treatment and orthopedic consultation. The patient denied head trauma, loss of conscious, fever, chest pain, shortness of breath, productive cough, nausea, vomiting, diarrhea. DISCHARGE DIAGNOSES / PLAN:      Right Femoral Fracture/Diaphysis/Periprosthetic: s/p mechanical fall at home  -XR right femur 10/22: femoral shaft fracture  -XR right femur 10/24: 9 intraoperative spot fluoroscopic views during right femur ORIF, improved alignment of distal femoral diaphyseal fracture. -PT/OT  -Ortho sx Dr. Kamala Gerber: 10/24 Right Femur Intramedullary Nail. Continue Prevena wound VAC. At discharge: please transition unit to home unit. Keep in place after discharge for 7 days when the battery will die. Then home nursing can change to dry, sterile dressing, Ice, elevate. Follow up with Dr. Roshan Hernandez in office in 10-14 days. Intractable Pain Right Femur: pain control, monitor closely. Bowel regimen. AFIB/A Flutter: continue Eliquis, telemetry-on admit, rate stable. -EKG 10/23: Atrial fibrillation with rapid ventricular response with premature ventricular or aberrantly conducted complexes. Nonspecific ST and T wave abnormality. -ECHO 10/24: Normal left ventricular SF, EF 55 - 60%. Left & Right ventricle size is normal, left ventricle normal wall thickness. Normal diastolic function. -CXR 10/22: cardiomegally, no acute cardiopulmonary process. CKD II: monitor renal fx, renal dose meds, avoid renal toxic agents/NSAIDS. Hyperglycemia: Hga1c 6.3(10/26), ISS ac&hs, hypoglycemic protocol. PCP follow. SAMMIE on CPAP: home CPAP settings, continue at hs. Morbid Obesity: BMI 56.76, low calorie, low carb, low fat, low na advised. Osteoarthritis: pain control, PT/OT, OsCal.   Anxiety: zoloft    BMI: Body mass index is 56.76 kg/m². . This patient: Meets criteria for overweight given BMI >/= 25 and < 30 due to excess calories/nutritional. Weight loss and lifestyle modifications should be encouraged as an outpatient. PENDING TEST RESULTS:   At the time of discharge the following test results are still pending: none. ADDITIONAL CARE RECOMMENDATIONS:   You have been deemed stable for discharge and are being discharged [facility] [address] [employer] [address]  . You are taking ELIQUIS a blood thinner- should you develop any abnormal or prolonged bleeding- seek care at nearest ER. Should you need medication refills beyond what we have prescribed for you, you will need to contact your primary care provider for refills.     Return to the ER or notify your primary care office if you have a fever greater than 101.5 associated with chills, chest pain, or signs and symptoms of a stroke which are:  B E F A S T  -loss of balance, headaches or dizziness  -eyes blurred vision (sudden)  -asymmetrical facial symmetry (side of face droops)  -arms and leg weakness  -slurred speech / difficulty speaking  -if you experience any of these (time to call for an ambulance)     NOTIFY YOUR PHYSICIAN FOR ANY OF THE FOLLOWING:   Fever over 101 degrees for 24 hours. Chest pain, shortness of breath, fever, chills, nausea, vomiting, diarrhea, change in mentation, falling, weakness, bleeding. Severe pain or pain not relieved by medications, as well as any other signs or symptoms that you may have questions about. FOLLOW UP APPOINTMENTS:    Follow-up Information       Follow up With Specialties Details Why Contact Info    Hoa Simon MD Orthopedic Surgery Schedule an appointment as soon as possible for a visit in 2 week(s) For Postop follow up [address] [place] [address]  [phone]      None    None (395) Patient stated that they have no PCP                 DIET: Cardiac Diet    ACTIVITY: PT/OT Eval and Treat    WOUND CARE:   10/24 Right Femur Intramedullary Nail. Continue Prevena wound VAC. At discharge: please transition unit to home unit. Keep in place after discharge for 7 days when the battery will die. Then home nursing can change to dry, sterile dressing, Ice, elevate. Follow up with Dr. Chayo Jennings in office in 10-14 days. Prevena dressing to [facility] for discharge. 10.24.2022 s/p Right Femur open reduction with Prevena VAC placement over incision. Right leg wound:  Hospital vac removed and Prevena dressing attached to [facility] High [facility]. Purple foam visible with suction at 125 mmHg without leak. Education, 2  extra canisters and handouts provided to patient. EQUIPMENT needed: per PT OT recommendations.       DISCHARGE MEDICATIONS:  Current Discharge Medication List        START taking these medications    Details   sertraline (ZOLOFT) 100 mg tablet Take 1 Tablet by mouth daily. Qty: 30 Tablet, Refills: 0  Start date: 10/29/2022      senna-docusate (PERICOLACE) 8.6-50 mg per tablet Take 1 Tablet by mouth two (2) times a day. Qty: 60 Tablet, Refills: 0  Start date: 10/28/2022      [facility]) 500 mg TG24 24 hour tablet Take 500 mg by mouth daily. Qty: 30 mg, Refills: 0  Start date: 10/28/2022      cyclobenzaprine (FLEXERIL) 10 mg tablet Take 1 Tablet by mouth three (3) times daily as needed for Muscle Spasm(s). Qty: 60 Tablet, Refills: 0  Start date: 10/28/2022      calcium-vitamin D (OS-HELEN +D3) 500 mg-200 unit per tablet Take 1 Tablet by mouth three (3) times daily (with meals). Qty: 90 Tablet, Refills: 0  Start date: 10/29/2022      acetaminophen (TYLENOL) 500 mg tablet Take 2 Tablets by mouth every six (6) hours as needed for Pain or Fever. Qty: 30 Tablet, Refills: 0  Start date: 10/28/2022           CONTINUE these medications which have NOT CHANGED    Details   apixaban (ELIQUIS) 5 mg tablet Take 5 mg by mouth two (2) times a day. Indications: treatment to prevent a blood clot in the lung      metoprolol tartrate (LOPRESSOR) 50 mg tablet Take 50 mg by mouth two (2) times a day. Indications: high blood pressure      lisinopriL (PRINIVIL, ZESTRIL) 5 mg tablet Take 5 mg by mouth daily. Indications: high blood pressure      rosuvastatin (CRESTOR) 10 mg tablet Take 10 mg by mouth nightly. Indications: excessive fat in the blood      pantoprazole (Protonix) 20 mg tablet Take 20 mg by mouth daily. Indications: heartburn      spironolactone (ALDACTONE) 50 mg tablet Take 12.5 mg by mouth daily. zolpidem (Ambien) 10 mg tablet Take 10 mg by mouth nightly as needed for Sleep.              DISPOSITION:    Home With:   OT  PT  HH  RN      X Long term SNF/Inpatient Rehab: [facility]    Independent/assisted living    Hospice Other: PATIENT CONDITION AT DISCHARGE:     Functional status   X Poor     Deconditioned     Independent      Cognition     Lucid    X Forgetful     Dementia      Catheters/lines (plus indication)    Parry     PICC     PEG    X None      Code status   X  Full code     DNR      PHYSICAL EXAMINATION AT DISCHARGE:  Constitutional:  No acute distress, cooperative, pleasant    ENT:  Oral mucosa moist.    Resp:  CTA bilaterally. No wheezing/rhonchi/rales. No accessory muscle use. CV:  Regular rhythm, normal rate, S1,S2.    GI/:  Soft, obese non tender, no guarding, BS present. Voids Freely. Musculoskeletal:  BLE trace edema, warm. RLE:dressing CDI, wound vac in place. Neurologic:  Moves all extremities. AAOx3. Skin:  w/d. Psych:  Good insight, Not anxious nor agitated.           CHRONIC MEDICAL DIAGNOSES:  Problem List as of 10/28/2022 Never Reviewed            Codes Class Noted - Resolved    Hip fracture [facility]) ICD-10-CM: C42.536Y  ICD-9-CM: 820.8  10/22/2022 - Present           Greater than 30 minutes were spent with the patient on counseling and coordination of care    Signed:   Kenia Lopez NP  10/28/2022  4:38 PM

## 2022-10-29 VITALS
OXYGEN SATURATION: 97 % | HEART RATE: 76 BPM | BODY MASS INDEX: 50.02 KG/M2 | HEIGHT: 64 IN | RESPIRATION RATE: 18 BRPM | TEMPERATURE: 97.4 F | SYSTOLIC BLOOD PRESSURE: 131 MMHG | WEIGHT: 293 LBS | DIASTOLIC BLOOD PRESSURE: 75 MMHG

## 2022-10-29 LAB
GLUCOSE BLD STRIP.AUTO-MCNC: 178 MG/DL (ref 65–117)
SERVICE CMNT-IMP: ABNORMAL

## 2022-10-29 PROCEDURE — 74011636637 HC RX REV CODE- 636/637: Performed by: PHYSICIAN ASSISTANT

## 2022-10-29 PROCEDURE — 74011250637 HC RX REV CODE- 250/637: Performed by: ORTHOPAEDIC SURGERY

## 2022-10-29 PROCEDURE — 74011250637 HC RX REV CODE- 250/637: Performed by: NURSE PRACTITIONER

## 2022-10-29 PROCEDURE — 74011250637 HC RX REV CODE- 250/637: Performed by: HOSPITALIST

## 2022-10-29 PROCEDURE — 74011250637 HC RX REV CODE- 250/637: Performed by: PHYSICIAN ASSISTANT

## 2022-10-29 PROCEDURE — 82962 GLUCOSE BLOOD TEST: CPT

## 2022-10-29 RX ADMIN — ZOLPIDEM TARTRATE 5 MG: 5 TABLET ORAL at 00:12

## 2022-10-29 RX ADMIN — SENNOSIDES AND DOCUSATE SODIUM 1 TABLET: 50; 8.6 TABLET ORAL at 08:45

## 2022-10-29 RX ADMIN — HYDROMORPHONE HYDROCHLORIDE 4 MG: 4 TABLET ORAL at 04:34

## 2022-10-29 RX ADMIN — Medication 2 UNITS: at 06:51

## 2022-10-29 RX ADMIN — PANTOPRAZOLE SODIUM 40 MG: 40 TABLET, DELAYED RELEASE ORAL at 06:17

## 2022-10-29 RX ADMIN — METOPROLOL TARTRATE 50 MG: 50 TABLET ORAL at 08:45

## 2022-10-29 RX ADMIN — ACETAMINOPHEN 1000 MG: 500 TABLET ORAL at 00:13

## 2022-10-29 RX ADMIN — ACETAMINOPHEN 1000 MG: 500 TABLET ORAL at 06:17

## 2022-10-29 RX ADMIN — CYCLOBENZAPRINE 10 MG: 10 TABLET, FILM COATED ORAL at 00:12

## 2022-10-29 RX ADMIN — OYSTER SHELL CALCIUM WITH VITAMIN D 1 TABLET: 500; 200 TABLET, FILM COATED ORAL at 08:47

## 2022-10-29 RX ADMIN — SPIRONOLACTONE 12.5 MG: 25 TABLET ORAL at 08:45

## 2022-10-29 RX ADMIN — LISINOPRIL 5 MG: 5 TABLET ORAL at 08:45

## 2022-10-29 RX ADMIN — HYDROMORPHONE HYDROCHLORIDE 4 MG: 4 TABLET ORAL at 08:45

## 2022-10-29 RX ADMIN — APIXABAN 5 MG: 5 TABLET, FILM COATED ORAL at 08:45

## 2022-10-29 RX ADMIN — SERTRALINE 100 MG: 50 TABLET, FILM COATED ORAL at 08:45

## 2022-10-29 NOTE — PROGRESS NOTES
Bedside and Verbal shift change report given to Kindra Arevalo RN (oncoming nurse) by Mj Hudson RN (offgoing nurse). Report included the following information SBAR and MAR.

## 2022-10-29 NOTE — PROGRESS NOTES
Contacted [facility] in [place] to update them on the new transport information.  Day shift to recall report tomorrow am.

## 2022-10-29 NOTE — PROGRESS NOTES
Transition Of Care: Discharge to [facility]. Transportation via [facility]. Time TBD. RUR: 11%    \"The patient is discharging to [facility] [address] [facility] with medicaid stretcher transport is to be picked up by 5:15pm today. ([phone]) Trip ID# [id number]. MD to MD hand off phone number: [phone] (Dr. Percy Browning). RN contracted to call wound care to place prevenia wound vac prior to discharge. RN to call report to: [phone]. Patient is going to room 215. Rapid covid ordered. \"    Medicaid transport was a no show last night. CM requested transport through [facility]. Waiting on pick-up time. CM notified [facility]. 0945  Medicaid ride finally showed up. [facility] cancelled.     Deuce Desir, [facility],[address]  [phone]

## 2022-10-29 NOTE — PROGRESS NOTES
Spoke to RN at [facility] to update on patient and departure from our facility via transport.

## 2022-10-29 NOTE — PROGRESS NOTES
Progress Note    Status Post: right Femur Intramedullary nail  Date of Surgery: 10/24/22  Surgeon: Dr. Gutierrez Last    Subjective:     No acute events over night. Lucienne Mcburney states that she is doing ok. She still has pain in the knee and leg. The Prevena wound VAC was transition to the home unit as they are planning discharge yesterday evening to an acute rehab facility at [facility] which is only 1 hour from her house, compared to here which is 3. She will plan to be discharged today. Denies CP, SOB, nausea/vomitting, parasthesias. Objective:   Visit Vitals  BP (!) 147/78 (BP 1 Location: Right lower arm)   Pulse 73   Temp 98.1 °F (36.7 °C)   Resp 18   Ht 5' 4\" (1.626 m)   Wt 150 kg (330 lb 11 oz)   SpO2 98%   BMI 56.76 kg/m²       Patient Vitals for the past 24 hrs:   Temp Pulse Resp BP SpO2   10/29/22 0600 -- 73 -- -- --   10/29/22 0434 98.1 °F (36.7 °C) 72 18 (!) 147/78 98 %   10/29/22 0400 -- 67 -- -- --   10/29/22 0156 -- 71 -- -- --   10/28/22 2156 -- 65 -- -- --   10/28/22 2000 -- 75 -- -- --   10/28/22 1925 98.2 °F (36.8 °C) 82 18 115/74 95 %   10/28/22 1800 -- 79 -- -- --   10/28/22 1600 -- 75 -- -- --   10/28/22 1504 98 °F (36.7 °C) 75 20 126/69 95 %   10/28/22 1400 -- 81 -- -- --   10/28/22 1200 -- 79 -- -- --   10/28/22 1001 98.5 °F (36.9 °C) 75 16 116/75 98 %   10/28/22 1000 -- 76 -- -- --        Physical Exam:  Gen: NAD, AAOx3  Resp: No acute respiratory distress  MSK:  RLE:  RLE:  Dressings clean, dry, and intact  Wound vacuum intact with no drainage in the canister. The wound vacuum was transition to the [address] [place] [address] portable unit because of her anticipated discharge yesterday.   She did not end up getting discharged  Motor intact distally  Sensation is intact to light touch distally    Ice was replaced over the knee              Intake/Output Summary (Last 24 hours) at 10/29/2022 5027  Last data filed at 10/29/2022 0434  Gross per 24 hour   Intake 240 ml   Output 2750 ml   Net -2510 ml       LABS :  Recent Results (from the past 24 hour(s))   GLUCOSE, POC    Collection Time: 10/28/22 10:55 AM   Result Value Ref Range    Glucose (POC) 187 (H) 65 - 117 mg/dL    Performed by Pearletha Primrose CON    COVID-19 RAPID TEST    Collection Time: 10/28/22  2:29 PM   Result Value Ref Range    Specimen source Nasopharyngeal      COVID-19 rapid test Not detected NOTD     GLUCOSE, POC    Collection Time: 10/28/22  3:56 PM   Result Value Ref Range    Glucose (POC) 207 (H) 65 - 117 mg/dL    Performed by Maria Isabel Steinberg, POC    Collection Time: 10/28/22  9:51 PM   Result Value Ref Range    Glucose (POC) 149 (H) 65 - 117 mg/dL    Performed by Raissa Horta    GLUCOSE, POC    Collection Time: 10/29/22  6:46 AM   Result Value Ref Range    Glucose (POC) 178 (H) 65 - 117 mg/dL    Performed by Ty Hooper         Assessment:   Raissa Blancas is a 63y.o. year-old female with right Femur Fracture status post Intramedullary nail by myself POD # 5    Plan:   -Aniyah Sicks: TTWB RLE  -DVT prophylaxis: SCDs, frequent ambulation, Eliquis  -Antibiotics: completed  -Pain control: Continue as needed pain management, ice to operative area, elevate  -PT/OT for mobilization  -Dispo: Discharge planning to Acute Rehab today  Follow up with me in office in 10-14 days        Shena Curry MD    10/29/22  7:28 AM        Shena Curry M.D.   [facility] Office  Cell: [phone]  Office: [phone]  Medical Staff: Richard Downing MA

## 2022-10-29 NOTE — PROGRESS NOTES
[address] [facility] Adult  Hospitalist Group                                                                                          Hospitalist Progress Note  Mihir Snowden NP  Answering service: [phone] from in house phone        Date of Service:  10/29/2022  NAME:  Cortes Fuentes  :  1960  MRN:  [id number]      Admission Summary:   Cortes Fuentes is a 63 y.o. female with a PMH of AFIB/Flutter on eliquis, CKD II, SAMMIE on CPAP, Osteoarthritis, Right Knee Replacement x2 years ago, Morbid Obesity who presented to ED with complaint of right knee pain after mechanical fall at home, tripped and fall in the kitchen on her right knee on 10/21. Initially evaluated in outside hospital, XR right knee: right femur fracture closed displaced comminuted fracture periprosthetic, transferred to [facility] for further evaluation and treatment and orthopedic consultation. The patient denied head trauma, loss of conscious, fever, chest pain, shortness of breath, productive cough, nausea, vomiting, diarrhea. Interval history / Subjective: Follow-up for issues listed below. [facility] delayed d/t transportation delay. Assessment & Plan:     Right Femoral Fracture/Diaphysis/Periprosthetic: s/p mechanical fall at home  -XR right femur 10/22: femoral shaft fracture  -XR right femur 10/24: 9 intraoperative spot fluoroscopic views during right femur ORIF, improved alignment of distal femoral diaphyseal fracture. -PT/OT  -Ortho sx Dr. Lindsey Stake: 10/24 Right Femur Intramedullary Nail. Continue Prevena wound VAC. At discharge: please transition unit to home unit. Keep in place after discharge for 7 days when the battery will die. Then home nursing can change to dry, sterile dressing, Ice, elevate. Follow up with Dr. Roshan Hernandez in office in 10-14 days. Intractable Pain Right Femur: pain control, monitor closely. Bowel regimen.      AFIB/A Flutter: continue Eliquis, telemetry-on admit, rate stable. -EKG 10/23: Atrial fibrillation with rapid ventricular response with premature ventricular or aberrantly conducted complexes. Nonspecific ST and T wave abnormality. -ECHO 10/24: Normal left ventricular SF, EF 55 - 60%. Left & Right ventricle size is normal, left ventricle normal wall thickness. Normal diastolic function. -CXR 10/22: cardiomegally, no acute cardiopulmonary process. CKD II: monitor renal fx, renal dose meds, avoid renal toxic agents/NSAIDS. Hyperglycemia: Hga1c 6.3(10/26), ISS ac&hs, hypoglycemic protocol. PCP follow. SAMMIE on CPAP: home CPAP settings, continue at hs. Morbid Obesity: BMI 56.76, low calorie, low carb, low fat, low na advised. Osteoarthritis: pain control, PT/OT, OsCal.   Anxiety: [facility] Problems  Never Reviewed            Codes Class Noted POA    Hip fracture [facility]) ICD-10-CM: B96.209O  ICD-9-CM: 820.8  10/22/2022 Unknown             Review of Systems:   A comprehensive review of systems was negative except for that written in the HPI. 22       Vital Signs:    Last 24hrs VS reviewed since prior progress note. Most recent are:  Visit Vitals  /75 (BP 1 Location: Right lower arm, BP Patient Position: At rest)   Pulse 76   Temp 97.4 °F (36.3 °C)   Resp 18   Ht 5' 4\" (1.626 m)   Wt 150 kg (330 lb 11 oz)   SpO2 97%   BMI 56.76 kg/m²         Intake/Output Summary (Last 24 hours) at 10/29/2022 1134  Last data filed at 10/29/2022 3763  Gross per 24 hour   Intake --   Output 2750 ml   Net -2750 ml        Physical Examination:     I had a face to face encounter with this patient and independently examined them on 10/29/2022 as outlined below:          Constitutional:  No acute distress, cooperative, pleasant    ENT:  Oral mucosa moist.    Resp:  CTA bilaterally. No wheezing/rhonchi/rales. No accessory muscle use.    CV:  Regular rhythm, normal rate, S1,S2.    GI/:  Soft, obese non tender, no guarding, BS present. Voids Freely. Musculoskeletal:  BLE trace edema, warm. RLE:dressing CDI, wound vac in place. Neurologic:  Moves all extremities. AAOx3. Skin:  w/d. Psych:  Good insight, Not anxious nor agitated. Data Review:    Review and/or order of clinical lab test      Labs:     Recent Labs     10/28/22  0423 10/27/22  0231   WBC 7.2 8.2   HGB 7.9* 7.7*   HCT 24.3* 23.7*    191     Recent Labs     10/28/22  0423 10/27/22  0231    134*   K 3.7 3.7    102   CO2 30 28   BUN 12 15   CREA 0.74 0.79   * 152*   CA 9.1 9.2   MG 1.8 1.7   PHOS 3.2 2.0*     No results for input(s): ALT, AP, TBIL, TBILI, TP, ALB, GLOB, GGT, AML, LPSE in the last 72 hours. No lab exists for component: SGOT, GPT, AMYP, HLPSE  No results for input(s): INR, PTP, APTT, INREXT in the last 72 hours. No results for input(s): FE, TIBC, PSAT, FERR in the last 72 hours. No results found for: FOL, RBCF   No results for input(s): PH, PCO2, PO2 in the last 72 hours. No results for input(s): CPK, CKNDX, TROIQ in the last 72 hours.     No lab exists for component: CPKMB  No results found for: CHOL, CHOLX, CHLST, CHOLV, HDL, HDLP, LDL, LDLC, DLDLP, TGLX, TRIGL, TRIGP, CHHD, CHHDX  Lab Results   Component Value Date/Time    Glucose (POC) 178 (H) 10/29/2022 06:46 AM    Glucose (POC) 149 (H) 10/28/2022 09:51 PM    Glucose (POC) 207 (H) 10/28/2022 03:56 PM    Glucose (POC) 187 (H) 10/28/2022 10:55 AM    Glucose (POC) 136 (H) 10/28/2022 06:45 AM     No results found for: COLOR, APPRN, SPGRU, REFSG, SHANELLE, PROTU, GLUCU, KETU, BILU, UROU, WOLF, LEUKU, GLUKE, EPSU, BACTU, WBCU, RBCU, CASTS, UCRY      Medications Reviewed:     Current Facility-Administered Medications   Medication Dose Route Frequency    0.9% sodium chloride infusion 250 mL  250 mL IntraVENous PRN    insulin lispro (HUMALOG) injection   SubCUTAneous AC&HS    dextrose 10 % infusion 0-250 mL  0-250 mL IntraVENous PRN    glucose chewable tablet 16 g  4 Tablet Oral PRN    glucagon (GLUCAGEN) injection 1 mg  1 mg IntraMUSCular PRN    dextrose 10 % infusion 0-250 mL  0-250 mL IntraVENous PRN    spironolactone (ALDACTONE) tablet 12.5 mg  12.5 mg Oral DAILY    pantoprazole (PROTONIX) tablet 40 mg  40 mg Oral ACB    rosuvastatin (CRESTOR) tablet 20 mg  20 mg Oral QHS    sertraline (ZOLOFT) tablet 100 mg  100 mg Oral DAILY    lisinopriL (PRINIVIL, ZESTRIL) tablet 5 mg  5 mg Oral DAILY    sodium chloride (NS) flush 5-40 mL  5-40 mL IntraVENous Q8H    sodium chloride (NS) flush 5-40 mL  5-40 mL IntraVENous PRN    naloxone (NARCAN) injection 0.4 mg  0.4 mg IntraVENous PRN    calcium-vitamin D (OS-HELEN +D3) 500 mg-200 unit per tablet 1 Tablet  1 Tablet Oral TID WITH MEALS    senna-docusate (PERICOLACE) 8.6-50 mg per tablet 1 Tablet  1 Tablet Oral BID    polyethylene glycol (MIRALAX) packet 17 g  17 g Oral DAILY    bisacodyL (DULCOLAX) suppository 10 mg  10 mg Rectal DAILY PRN    apixaban (ELIQUIS) tablet 5 mg  5 mg Oral BID    metoprolol tartrate (LOPRESSOR) tablet 50 mg  50 mg Oral BID    cyclobenzaprine (FLEXERIL) tablet 10 mg  10 mg Oral TID PRN    HYDROmorphone (DILAUDID) tablet 2 mg  2 mg Oral Q4H PRN    zolpidem (AMBIEN) tablet 5 mg  5 mg Oral QHS PRN    sodium chloride (NS) flush 5-40 mL  5-40 mL IntraVENous Q8H    sodium chloride (NS) flush 5-40 mL  5-40 mL IntraVENous PRN    polyethylene glycol (MIRALAX) packet 17 g  17 g Oral DAILY PRN    ondansetron (ZOFRAN ODT) tablet 4 mg  4 mg Oral Q8H PRN    Or    ondansetron (ZOFRAN) injection 4 mg  4 mg IntraVENous Q6H PRN    hydrOXYzine HCL (ATARAX) tablet 10 mg  10 mg Oral TID PRN    acetaminophen (TYLENOL) tablet 1,000 mg  1,000 mg Oral Q6H    HYDROmorphone (DILAUDID) tablet 4 mg  4 mg Oral Q4H PRN     Current Outpatient Medications   Medication Sig    sertraline (ZOLOFT) 100 mg tablet Take 1 Tablet by mouth daily. senna-docusate (PERICOLACE) 8.6-50 mg per tablet Take 1 Tablet by mouth two (2) times a day.     metFORMIN [facility]) 500 mg TG24 24 hour tablet Take 500 mg by mouth daily. cyclobenzaprine (FLEXERIL) 10 mg tablet Take 1 Tablet by mouth three (3) times daily as needed for Muscle Spasm(s). calcium-vitamin D (OS-HELEN +D3) 500 mg-200 unit per tablet Take 1 Tablet by mouth three (3) times daily (with meals). acetaminophen (TYLENOL) 500 mg tablet Take 2 Tablets by mouth every six (6) hours as needed for Pain or Fever. apixaban (ELIQUIS) 5 mg tablet Take 5 mg by mouth two (2) times a day. Indications: treatment to prevent a blood clot in the lung    metoprolol tartrate (LOPRESSOR) 50 mg tablet Take 50 mg by mouth two (2) times a day. Indications: high blood pressure    lisinopriL (PRINIVIL, ZESTRIL) 5 mg tablet Take 5 mg by mouth daily. Indications: high blood pressure    rosuvastatin (CRESTOR) 10 mg tablet Take 10 mg by mouth nightly. Indications: excessive fat in the blood    pantoprazole (Protonix) 20 mg tablet Take 20 mg by mouth daily. Indications: heartburn    spironolactone (ALDACTONE) 50 mg tablet Take 12.5 mg by mouth daily.     zolpidem (Ambien) 10 mg tablet Take 10 mg by mouth nightly as needed for Sleep.     ______________________________________________________________________  EXPECTED LENGTH OF STAY: 2d 19h  ACTUAL LENGTH OF STAY:          [address], [place]

## 2022-10-29 NOTE — PROGRESS NOTES
Problem: Falls - Risk of  Goal: *Absence of Falls  Description: Document Julian Lobato Fall Risk and appropriate interventions in the flowsheet. Outcome: Progressing Towards Goal  Note: Fall Risk Interventions:  Mobility Interventions: Bed/chair exit alarm, Communicate number of staff needed for ambulation/transfer, Patient to call before getting OOB         Medication Interventions: Assess postural VS orthostatic hypotension, Bed/chair exit alarm, Evaluate medications/consider consulting pharmacy, Patient to call before getting OOB    Elimination Interventions: Call light in reach, Patient to call for help with toileting needs, Bed/chair exit alarm    History of Falls Interventions: Bed/chair exit alarm, Door open when patient unattended, Evaluate medications/consider consulting pharmacy, Investigate reason for fall         Problem: Patient Education: Go to Patient Education Activity  Goal: Patient/Family Education  Outcome: Progressing Towards Goal     Problem: Pressure Injury - Risk of  Goal: *Prevention of pressure injury  Description: Document Wilber Scale and appropriate interventions in the flowsheet.   Outcome: Progressing Towards Goal  Note: Pressure Injury Interventions:  Sensory Interventions: Assess changes in LOC    Moisture Interventions: Absorbent underpads, Internal/External urinary devices    Activity Interventions: Increase time out of bed, PT/OT evaluation    Mobility Interventions: HOB 30 degrees or less, PT/OT evaluation    Nutrition Interventions: Document food/fluid/supplement intake    Friction and Shear Interventions: Minimize layers                Problem: Patient Education: Go to Patient Education Activity  Goal: Patient/Family Education  Outcome: Progressing Towards Goal     Problem: Patient Education: Go to Patient Education Activity  Goal: Patient/Family Education  Outcome: Progressing Towards Goal     Problem: Patient Education: Go to Patient Education Activity  Goal: Patient/Family Education  Outcome: Progressing Towards Goal

## (undated) DEVICE — SUTURE VCRL SZ 1 L36IN ABSRB UD L36MM CT-1 1/2 CIR J947H

## (undated) DEVICE — DECANTER BAG 9": Brand: MEDLINE INDUSTRIES, INC.

## (undated) DEVICE — DRAPE C-ARMOUR C-ARM KIT --

## (undated) DEVICE — BANDAGE COMPR M W6INXL10YD WHT BGE VELC E MTRX HK AND LOOP

## (undated) DEVICE — Device

## (undated) DEVICE — SPONGE GZ W4XL4IN COT 12 PLY TYP VII WVN C FLD DSGN

## (undated) DEVICE — 3M™ TEGADERM™ TRANSPARENT FILM DRESSING FRAME STYLE, 1626W, 4 IN X 4-3/4 IN (10 CM X 12 CM), 50/CT 4CT/CASE: Brand: 3M™ TEGADERM™

## (undated) DEVICE — GAUZE,SPONGE,4"X4",16PLY,STRL,LF,10/TRAY: Brand: MEDLINE

## (undated) DEVICE — YANKAUER,FLEXIBLE HANDLE,REGLR CAPACITY: Brand: MEDLINE INDUSTRIES, INC.

## (undated) DEVICE — SUTURE VCRL SZ 2-0 L36IN ABSRB UD L40MM CT 1/2 CIR J957H

## (undated) DEVICE — CONTAINER,SPECIMEN,3OZ,OR STRL: Brand: MEDLINE

## (undated) DEVICE — PREP SKN CHLRAPRP APL 26ML STR --

## (undated) DEVICE — SCRUB DRY SURG EZ SCRUB BRUSH PREOPERATIVE GRN

## (undated) DEVICE — DRESSING,GAUZE,XEROFORM,CURAD,5"X9",ST: Brand: CURAD

## (undated) DEVICE — SYSTEM WND THER 14 DAY 150 CC CANSTR THER UNIT PREVENA + 125

## (undated) DEVICE — DRSG POSTOP PRMSL AG 3.5X14IN

## (undated) DEVICE — GUIDEWIRE ORTH L100CM DIA3MM S STL SMOOTH BLNT TIP

## (undated) DEVICE — SUTURE STRATAFIX SYMMETRIC PDS + SZ 1 L18IN ABSRB VLT L48MM SXPP1A400

## (undated) DEVICE — 4-PORT MANIFOLD: Brand: NEPTUNE 2

## (undated) DEVICE — HANDPIECE SET WITH BONE CLEANING TIP AND SUCTION TUBE: Brand: INTERPULSE

## (undated) DEVICE — TOTAL JOINT - SMH: Brand: MEDLINE INDUSTRIES, INC.

## (undated) DEVICE — SOLUTION IRRIG 1000ML STRL H2O USP PLAS POUR BTL

## (undated) DEVICE — INTENDED FOR TISSUE SEPARATION, AND OTHER PROCEDURES THAT REQUIRE A SHARP SURGICAL BLADE TO PUNCTURE OR CUT.: Brand: BARD-PARKER ® CARBON RIB-BACK BLADES

## (undated) DEVICE — SYR 20ML LL STRL LF --

## (undated) DEVICE — DERMABOND SKIN ADH 0.7ML -- DERMABOND ADVANCED 12/BX